# Patient Record
Sex: MALE | Race: BLACK OR AFRICAN AMERICAN | Employment: UNEMPLOYED | URBAN - METROPOLITAN AREA
[De-identification: names, ages, dates, MRNs, and addresses within clinical notes are randomized per-mention and may not be internally consistent; named-entity substitution may affect disease eponyms.]

---

## 2017-10-26 ENCOUNTER — HOSPITAL ENCOUNTER (OUTPATIENT)
Dept: PREADMISSION TESTING | Age: 37
Discharge: HOME | End: 2017-10-26
Payer: MEDICARE

## 2017-10-26 DIAGNOSIS — Z01.818 PRE-OP EXAM: ICD-10-CM

## 2017-10-26 LAB
ANION GAP SERPL CALC-SCNC: 15 MMOL/L (ref 3–18)
ATRIAL RATE: 77 BPM
BUN SERPL-MCNC: 43 MG/DL (ref 7–18)
BUN/CREAT SERPL: 6 (ref 12–20)
CALCIUM SERPL-MCNC: 7.5 MG/DL (ref 8.5–10.1)
CALCULATED P AXIS, ECG09: 65 DEGREES
CALCULATED R AXIS, ECG10: 47 DEGREES
CALCULATED T AXIS, ECG11: 24 DEGREES
CHLORIDE SERPL-SCNC: 98 MMOL/L (ref 100–108)
CO2 SERPL-SCNC: 26 MMOL/L (ref 21–32)
CREAT SERPL-MCNC: 7.04 MG/DL (ref 0.6–1.3)
DIAGNOSIS, 93000: NORMAL
GLUCOSE SERPL-MCNC: 90 MG/DL (ref 74–99)
HCT VFR BLD AUTO: 21.5 % (ref 36–48)
HGB BLD-MCNC: 7.3 G/DL (ref 13–16)
P-R INTERVAL, ECG05: 180 MS
POTASSIUM SERPL-SCNC: 3.4 MMOL/L (ref 3.5–5.5)
Q-T INTERVAL, ECG07: 432 MS
QRS DURATION, ECG06: 98 MS
QTC CALCULATION (BEZET), ECG08: 488 MS
SODIUM SERPL-SCNC: 139 MMOL/L (ref 136–145)
VENTRICULAR RATE, ECG03: 77 BPM

## 2017-10-26 PROCEDURE — 85018 HEMOGLOBIN: CPT | Performed by: ANESTHESIOLOGY

## 2017-10-26 PROCEDURE — 36415 COLL VENOUS BLD VENIPUNCTURE: CPT | Performed by: ANESTHESIOLOGY

## 2017-10-26 PROCEDURE — 93005 ELECTROCARDIOGRAM TRACING: CPT

## 2017-10-26 PROCEDURE — 80048 BASIC METABOLIC PNL TOTAL CA: CPT | Performed by: ANESTHESIOLOGY

## 2017-11-01 NOTE — H&P
PATIENT: Andre Salas  YOB: 1980  DATE: 10/03/2017 2:45 PM   VISIT TYPE: Office Visit  _____________________________________________________________    Completed Orders (this encounter)  Order Interpretation Result Next Lab Date   surgery instructions given education        Assessment/Plan  # Detail Type Description    1. Assessment Secondary hyperparathyroidism of renal origin (N25.81). Impression discussed total PTX with autotransplant, risks and benefits. Patient Plan total parathyroidectomy with autotransplant    Plan Orders The patient had the following test(s) completed today US SOFT TISSUE HEAD AND NECK, Thyroid, Parathyroid, Parotid. This 44year old male presents for hyperparathyroidism. History of Present Illness:  1.  hyperparathyroidism      Onset: gradual.  The problem is severe. The problem has worsened. Presenting / Initial symptoms include bone pain, dry skin and muscle cramping. Risk factors excluded are parathyroidectomy. Intercurrent diseases included are hyperparathyroidism and renal insufficiency/failure. Labs completed to date include alkaline phosphatase, calcium, ionized, CMP and intact PTH. Pertinent negatives include dysphagia, nocturia, paresthesias. Additional information: pt gets HD MWF thru R forearm AVF.         Performed Test Interpretation Result   10/04/2017 US SOFT TISSUE HEAD AND NECK, Thyroid, Parathyroid, Parotid  US of the neck shows no intrathyroidal parathyroid glands, the R sup gland is very large, both inferior glands are identified, the Left superior gland was not visible           PAST MEDICAL/SURGICAL HISTORY  (Detailed)    Disease/disorder Onset Date Management Date Comments     femur 2013      skin graft 2010      loss limbs 2009    Crohns disease       Hypertension       Renal disease         Family History:  (Detailed)  Relationship Family Member Name  Age at Death Condition Onset Age Cause of Death Family history of Diabetes mellitus  N   Father  N  Alive and well 79 N   Mother  N  Alive and well 59 N       Social History:  (Detailed)  Tobacco use reviewed. Preferred language is [place]. MARITAL STATUS/FAMILY/SOCIAL SUPPORT  Currently single. Smoking status: Never smoker. SMOKING STATUS  Use Status Type Smoking Status Usage Per Day Years Used Total Pack Years   no/never  Never smoker          ALCOHOL  There is no history of alcohol use. Buddhist/SPIRITUAL  The patient has None Gnosticism affiliation. Patient Status   Completed with information received for patient transitioning into care. Completed with information received for patient in a summary of care record. Medication Reconciliation  Medications reconciled today. Medication Reviewed  Adherence Medication Name Sig Desc Elsewhere Status   taking as directed LISINOPRIL take 1 tablet by oral route  every day Y Verified   taking as directed AMLODIPINE BESYLATE take 1 tablet by oral route  every day Y Verified   taking as directed ASPIRIN take 1 tablet by oral route  every day Y Verified   taking as directed metoprolol tartrate 25 mg tablet take 1 tablet by oral route 2 times every day N Verified   taking as directed prednisone 20 mg tablet take 1 tablet by oral route 3 times every day N Verified   taking as directed Sensipar 90 mg tablet take 1 tablet by oral route  every day with food N Verified   taking as directed Vicodin take 1 tablet by oral route  every 4 - 6 hours as needed for pain Y Verified     Allergies:  Ingredient Reaction Medication Name Comment   ALPRAZOLAM  Xanax    (Reviewed, no changes.)  Review of Systems  System Neg/Pos Details   Constitutional Negative Fever, night sweats and weight loss. ENMT Negative Hearing loss, tinnitus, vertigo and voice change. Eyes Negative Diplopia and vision loss. Respiratory Negative Asthma, cough, dyspnea, hemoptysis, known TB exposure and wheezing.    Cardio Negative Chest pain, claudication, edema, irregular heartbeat/palpitations and thrombophlebitis. GI Negative Bloating, dysphagia, hemorrhoids, jaundice and reflux.  Negative Dysuria, nocturia, passage stone/gravel and urinary incontinence. Endocrine Negative Cold intolerance and goiter. Neuro Negative Focal weakness, headache, paresthesia, seizures and syncope. Integumentary Negative Change in shape/size of mole(s) and skin lesion. MS Positive Myalgia. MS Negative Back pain, bone/joint symptoms and muscle weakness. Hema/Lymph Negative Easy bleeding and easy bruising. Allergic/Immuno Negative Contact allergy and contact dermatitis. Vital Signs     Height  Time ft in cm Last Measured Height Position   2:38 PM 5.0 9.00 175.26 10/03/2017 0     Weight/BSA/BMI  Time lb oz kg Context BMI kg/m2 BSA m2   2:38 .00  62.596 dressed with shoes 20.38      Blood Pressure  Time BP mm/Hg Position Side Site Method Cuff Size   2:38 /98 sitting right arm automatic adult     Temperature/Pulse/Respiration  Time Temp F Temp C Temp Site Pulse/min Pattern Resp/ min   2:38 PM 98.80 37.11  83 regular      Measured By  Time Measured by   2:38 PM Mayte Wallace       Physical Exam:  Exam Findings Details   Constitutional Normal No acute distress. Well nourished. Well developed. Eyes Normal General - Right: Normal, Left: Normal. Sclera - Right: Normal, Left: Normal.   Neck Exam Normal Inspection - Normal. Palpation - Normal. Parotid gland - Normal. Thyroid gland - Normal. Submandibular lymph nodes - Normal. Cervical lymph nodes - Normal.   Lymph Detail Normal Occipital. Postauricular. Preauricular. Submental. Submandibular. Parotid. Anterior cervical. Posterior cervical. Supraclavicular. Respiratory Normal Cough - Absent. Effort - Normal.   Cardiovascular Normal Inspection - JVD: Absent. Heart rate - Regular rate. Rhythm - Regular. Abdomen Normal Patient is not obese. No abdominal tenderness. No palpable mass. Skin * Inspection - General inspection: warm and dry. Extremity * Fistula - good bruit. Extremity Comments R forearm fistula   Neurological Normal Level of consciousness - Normal. Orientation - Normal. Memory - Normal.   Psychiatric Normal Orientation - Oriented to time, place, person & situation. No agitation. Not anxious. Appropriate mood and affect.          Medications (added, continued, or stopped this visit):  Started Medication Directions Instruction Stopped   05/01/2014 AMLODIPINE BESYLATE take 1 tablet by oral route  every day     05/01/2014 ASPIRIN take 1 tablet by oral route  every day     05/01/2014 LISINOPRIL take 1 tablet by oral route  every day     05/01/2014 metoprolol tartrate 25 mg tablet take 1 tablet by oral route 2 times every day     05/01/2014 prednisone 20 mg tablet take 1 tablet by oral route 3 times every day     05/01/2014 Sensipar 90 mg tablet take 1 tablet by oral route  every day with food     05/01/2014 Vicodin take 1 tablet by oral route  every 4 - 6 hours as needed for pain     Completed by:        Burt Pepper 10/04/2017 1:13 PM   Document generated by:  Marbin Luther 10/04/2017 01:13 PM     -----------------------------------------------------------------------------------------------------------                          Electronically signed by Marbin Luther MD on 10/04/2017 01:27 PM

## 2017-11-02 ENCOUNTER — HOSPITAL ENCOUNTER (INPATIENT)
Age: 37
LOS: 1 days | Discharge: HOME | DRG: 674 | End: 2017-11-03
Attending: SURGERY | Admitting: SURGERY
Payer: MEDICARE

## 2017-11-02 ENCOUNTER — ANESTHESIA (OUTPATIENT)
Dept: SURGERY | Age: 37
DRG: 674 | End: 2017-11-02
Payer: MEDICARE

## 2017-11-02 ENCOUNTER — ANESTHESIA EVENT (OUTPATIENT)
Dept: SURGERY | Age: 37
DRG: 674 | End: 2017-11-02
Payer: MEDICARE

## 2017-11-02 PROBLEM — E89.2 S/P PARATHYROIDECTOMY (HCC): Status: ACTIVE | Noted: 2017-11-02

## 2017-11-02 PROBLEM — Z89.512 S/P BILATERAL BKA (BELOW KNEE AMPUTATION) (HCC): Status: ACTIVE | Noted: 2017-11-02

## 2017-11-02 PROBLEM — Z99.2 ESRD (END STAGE RENAL DISEASE) ON DIALYSIS (HCC): Status: ACTIVE | Noted: 2017-11-02

## 2017-11-02 PROBLEM — N25.81 HYPERPARATHYROIDISM, SECONDARY RENAL (HCC): Status: ACTIVE | Noted: 2017-11-02

## 2017-11-02 PROBLEM — Z89.511 S/P BILATERAL BKA (BELOW KNEE AMPUTATION) (HCC): Status: ACTIVE | Noted: 2017-11-02

## 2017-11-02 PROBLEM — I10 HTN (HYPERTENSION): Status: ACTIVE | Noted: 2017-11-02

## 2017-11-02 PROBLEM — N18.6 ESRD (END STAGE RENAL DISEASE) ON DIALYSIS (HCC): Status: ACTIVE | Noted: 2017-11-02

## 2017-11-02 LAB
ALBUMIN SERPL-MCNC: 2.8 G/DL (ref 3.4–5)
CA-I SERPL-SCNC: 0.8 MMOL/L (ref 1.12–1.32)
CALCIUM SERPL-MCNC: 7.1 MG/DL (ref 8.5–10.1)
INR PPP: 1.2 (ref 0.8–1.2)
POTASSIUM SERPL-SCNC: 3.8 MMOL/L (ref 3.5–5.5)
PROTHROMBIN TIME: 15 SEC (ref 11.5–15.2)

## 2017-11-02 PROCEDURE — 77030018836 HC SOL IRR NACL ICUM -A: Performed by: SURGERY

## 2017-11-02 PROCEDURE — 76210000016 HC OR PH I REC 1 TO 1.5 HR: Performed by: SURGERY

## 2017-11-02 PROCEDURE — 82330 ASSAY OF CALCIUM: CPT | Performed by: SURGERY

## 2017-11-02 PROCEDURE — 74011250636 HC RX REV CODE- 250/636

## 2017-11-02 PROCEDURE — 74011250636 HC RX REV CODE- 250/636: Performed by: SURGERY

## 2017-11-02 PROCEDURE — 74011250636 HC RX REV CODE- 250/636: Performed by: PHYSICIAN ASSISTANT

## 2017-11-02 PROCEDURE — 77030010512 HC APPL CLP LIG J&J -C: Performed by: SURGERY

## 2017-11-02 PROCEDURE — 76010000153 HC OR TIME 1.5 TO 2 HR: Performed by: SURGERY

## 2017-11-02 PROCEDURE — 88331 PATH CONSLTJ SURG 1 BLK 1SPC: CPT | Performed by: SURGERY

## 2017-11-02 PROCEDURE — 82310 ASSAY OF CALCIUM: CPT | Performed by: SURGERY

## 2017-11-02 PROCEDURE — 88332 PATH CONSLTJ SURG EA ADD BLK: CPT | Performed by: SURGERY

## 2017-11-02 PROCEDURE — 74011250637 HC RX REV CODE- 250/637: Performed by: SURGERY

## 2017-11-02 PROCEDURE — 74011000258 HC RX REV CODE- 258: Performed by: SURGERY

## 2017-11-02 PROCEDURE — 85610 PROTHROMBIN TIME: CPT | Performed by: PHYSICIAN ASSISTANT

## 2017-11-02 PROCEDURE — 77030003029 HC SUT VCRL J&J -B: Performed by: SURGERY

## 2017-11-02 PROCEDURE — 0GTM0ZZ RESECTION OF LEFT SUPERIOR PARATHYROID GLAND, OPEN APPROACH: ICD-10-PCS | Performed by: SURGERY

## 2017-11-02 PROCEDURE — 76060000034 HC ANESTHESIA 1.5 TO 2 HR: Performed by: SURGERY

## 2017-11-02 PROCEDURE — 82040 ASSAY OF SERUM ALBUMIN: CPT | Performed by: PHYSICIAN ASSISTANT

## 2017-11-02 PROCEDURE — 86920 COMPATIBILITY TEST SPIN: CPT | Performed by: SURGERY

## 2017-11-02 PROCEDURE — 88305 TISSUE EXAM BY PATHOLOGIST: CPT | Performed by: SURGERY

## 2017-11-02 PROCEDURE — 77030032490 HC SLV COMPR SCD KNE COVD -B: Performed by: SURGERY

## 2017-11-02 PROCEDURE — 77030006643: Performed by: ANESTHESIOLOGY

## 2017-11-02 PROCEDURE — 36415 COLL VENOUS BLD VENIPUNCTURE: CPT | Performed by: SURGERY

## 2017-11-02 PROCEDURE — 77030008683 HC TU ET CUF COVD -A: Performed by: ANESTHESIOLOGY

## 2017-11-02 PROCEDURE — 0GTN0ZZ RESECTION OF RIGHT INFERIOR PARATHYROID GLAND, OPEN APPROACH: ICD-10-PCS | Performed by: SURGERY

## 2017-11-02 PROCEDURE — 84132 ASSAY OF SERUM POTASSIUM: CPT | Performed by: SURGERY

## 2017-11-02 PROCEDURE — 74011000250 HC RX REV CODE- 250

## 2017-11-02 PROCEDURE — 74011000250 HC RX REV CODE- 250: Performed by: SURGERY

## 2017-11-02 PROCEDURE — 77030034115 HC SHR ENDO COAG HARM FOCS J&J -F: Performed by: SURGERY

## 2017-11-02 PROCEDURE — 74011636637 HC RX REV CODE- 636/637: Performed by: SURGERY

## 2017-11-02 PROCEDURE — 0GSP0ZZ REPOSITION LEFT INFERIOR PARATHYROID GLAND, OPEN APPROACH: ICD-10-PCS | Performed by: SURGERY

## 2017-11-02 PROCEDURE — 0GTP0ZZ RESECTION OF LEFT INFERIOR PARATHYROID GLAND, OPEN APPROACH: ICD-10-PCS | Performed by: SURGERY

## 2017-11-02 PROCEDURE — 86900 BLOOD TYPING SEROLOGIC ABO: CPT | Performed by: SURGERY

## 2017-11-02 PROCEDURE — 77030020782 HC GWN BAIR PAWS FLX 3M -B: Performed by: SURGERY

## 2017-11-02 PROCEDURE — 77030018390 HC SPNG HEMSTAT2 J&J -B: Performed by: SURGERY

## 2017-11-02 PROCEDURE — 77030002935 HC SUT MCRYL J&J -C: Performed by: SURGERY

## 2017-11-02 PROCEDURE — 0GTL0ZZ RESECTION OF RIGHT SUPERIOR PARATHYROID GLAND, OPEN APPROACH: ICD-10-PCS | Performed by: SURGERY

## 2017-11-02 PROCEDURE — 77030010375: Performed by: SURGERY

## 2017-11-02 PROCEDURE — 77030008477 HC STYL SATN SLP COVD -A: Performed by: ANESTHESIOLOGY

## 2017-11-02 PROCEDURE — 77030011267 HC ELECTRD BLD COVD -A: Performed by: SURGERY

## 2017-11-02 RX ORDER — DEXTROSE 50 % IN WATER (D50W) INTRAVENOUS SYRINGE
25-50 AS NEEDED
Status: DISCONTINUED | OUTPATIENT
Start: 2017-11-02 | End: 2017-11-02 | Stop reason: HOSPADM

## 2017-11-02 RX ORDER — SODIUM CHLORIDE 0.9 % (FLUSH) 0.9 %
5-10 SYRINGE (ML) INJECTION EVERY 8 HOURS
Status: DISCONTINUED | OUTPATIENT
Start: 2017-11-02 | End: 2017-11-03 | Stop reason: HOSPADM

## 2017-11-02 RX ORDER — OXYCODONE AND ACETAMINOPHEN 5; 325 MG/1; MG/1
1 TABLET ORAL
Status: DISCONTINUED | OUTPATIENT
Start: 2017-11-02 | End: 2017-11-03 | Stop reason: HOSPADM

## 2017-11-02 RX ORDER — ONDANSETRON 2 MG/ML
4 INJECTION INTRAMUSCULAR; INTRAVENOUS ONCE
Status: DISCONTINUED | OUTPATIENT
Start: 2017-11-02 | End: 2017-11-02 | Stop reason: HOSPADM

## 2017-11-02 RX ORDER — CEFAZOLIN SODIUM 2 G/50ML
2 SOLUTION INTRAVENOUS ONCE
Status: COMPLETED | OUTPATIENT
Start: 2017-11-02 | End: 2017-11-02

## 2017-11-02 RX ORDER — SODIUM CHLORIDE 0.9 % (FLUSH) 0.9 %
5-10 SYRINGE (ML) INJECTION AS NEEDED
Status: DISCONTINUED | OUTPATIENT
Start: 2017-11-02 | End: 2017-11-02 | Stop reason: HOSPADM

## 2017-11-02 RX ORDER — LABETALOL HYDROCHLORIDE 5 MG/ML
INJECTION, SOLUTION INTRAVENOUS AS NEEDED
Status: DISCONTINUED | OUTPATIENT
Start: 2017-11-02 | End: 2017-11-02 | Stop reason: HOSPADM

## 2017-11-02 RX ORDER — CALCIUM CARBONATE 200(500)MG
400 TABLET,CHEWABLE ORAL
Status: DISCONTINUED | OUTPATIENT
Start: 2017-11-03 | End: 2017-11-03

## 2017-11-02 RX ORDER — HEPARIN SODIUM 5000 [USP'U]/ML
5000 INJECTION, SOLUTION INTRAVENOUS; SUBCUTANEOUS EVERY 8 HOURS
Status: DISCONTINUED | OUTPATIENT
Start: 2017-11-02 | End: 2017-11-03

## 2017-11-02 RX ORDER — INSULIN LISPRO 100 [IU]/ML
INJECTION, SOLUTION INTRAVENOUS; SUBCUTANEOUS ONCE
Status: DISCONTINUED | OUTPATIENT
Start: 2017-11-02 | End: 2017-11-02 | Stop reason: HOSPADM

## 2017-11-02 RX ORDER — FENTANYL CITRATE 50 UG/ML
INJECTION, SOLUTION INTRAMUSCULAR; INTRAVENOUS AS NEEDED
Status: DISCONTINUED | OUTPATIENT
Start: 2017-11-02 | End: 2017-11-02 | Stop reason: HOSPADM

## 2017-11-02 RX ORDER — ALBUMIN HUMAN 250 G/1000ML
12.5 SOLUTION INTRAVENOUS
Status: DISCONTINUED | OUTPATIENT
Start: 2017-11-02 | End: 2017-11-03 | Stop reason: HOSPADM

## 2017-11-02 RX ORDER — FENTANYL CITRATE 50 UG/ML
25 INJECTION, SOLUTION INTRAMUSCULAR; INTRAVENOUS
Status: DISCONTINUED | OUTPATIENT
Start: 2017-11-02 | End: 2017-11-02 | Stop reason: HOSPADM

## 2017-11-02 RX ORDER — NEOSTIGMINE METHYLSULFATE 5 MG/5 ML
SYRINGE (ML) INTRAVENOUS AS NEEDED
Status: DISCONTINUED | OUTPATIENT
Start: 2017-11-02 | End: 2017-11-02 | Stop reason: HOSPADM

## 2017-11-02 RX ORDER — ROCURONIUM BROMIDE 10 MG/ML
INJECTION, SOLUTION INTRAVENOUS AS NEEDED
Status: DISCONTINUED | OUTPATIENT
Start: 2017-11-02 | End: 2017-11-02 | Stop reason: HOSPADM

## 2017-11-02 RX ORDER — LIDOCAINE HYDROCHLORIDE 20 MG/ML
INJECTION, SOLUTION EPIDURAL; INFILTRATION; INTRACAUDAL; PERINEURAL AS NEEDED
Status: DISCONTINUED | OUTPATIENT
Start: 2017-11-02 | End: 2017-11-02 | Stop reason: HOSPADM

## 2017-11-02 RX ORDER — PROPOFOL 10 MG/ML
INJECTION, EMULSION INTRAVENOUS AS NEEDED
Status: DISCONTINUED | OUTPATIENT
Start: 2017-11-02 | End: 2017-11-02 | Stop reason: HOSPADM

## 2017-11-02 RX ORDER — PREDNISONE 10 MG/1
10 TABLET ORAL DAILY
Status: DISCONTINUED | OUTPATIENT
Start: 2017-11-02 | End: 2017-11-03 | Stop reason: HOSPADM

## 2017-11-02 RX ORDER — ASPIRIN 81 MG/1
81 TABLET ORAL DAILY
Status: DISCONTINUED | OUTPATIENT
Start: 2017-11-02 | End: 2017-11-03 | Stop reason: HOSPADM

## 2017-11-02 RX ORDER — MAGNESIUM SULFATE 100 %
4 CRYSTALS MISCELLANEOUS AS NEEDED
Status: DISCONTINUED | OUTPATIENT
Start: 2017-11-02 | End: 2017-11-02 | Stop reason: HOSPADM

## 2017-11-02 RX ORDER — SODIUM CHLORIDE 9 MG/ML
50 INJECTION, SOLUTION INTRAVENOUS CONTINUOUS
Status: DISCONTINUED | OUTPATIENT
Start: 2017-11-02 | End: 2017-11-02

## 2017-11-02 RX ORDER — HYDROMORPHONE HYDROCHLORIDE 2 MG/ML
0.5 INJECTION, SOLUTION INTRAMUSCULAR; INTRAVENOUS; SUBCUTANEOUS
Status: DISCONTINUED | OUTPATIENT
Start: 2017-11-02 | End: 2017-11-02 | Stop reason: HOSPADM

## 2017-11-02 RX ORDER — AMLODIPINE BESYLATE 5 MG/1
10 TABLET ORAL DAILY
Status: DISCONTINUED | OUTPATIENT
Start: 2017-11-02 | End: 2017-11-03 | Stop reason: HOSPADM

## 2017-11-02 RX ORDER — METOPROLOL SUCCINATE 50 MG/1
100 TABLET, EXTENDED RELEASE ORAL DAILY
Status: DISCONTINUED | OUTPATIENT
Start: 2017-11-02 | End: 2017-11-03 | Stop reason: HOSPADM

## 2017-11-02 RX ORDER — BUPIVACAINE HYDROCHLORIDE 5 MG/ML
INJECTION, SOLUTION EPIDURAL; INTRACAUDAL AS NEEDED
Status: DISCONTINUED | OUTPATIENT
Start: 2017-11-02 | End: 2017-11-02 | Stop reason: HOSPADM

## 2017-11-02 RX ORDER — OXYCODONE AND ACETAMINOPHEN 5; 325 MG/1; MG/1
1 TABLET ORAL AS NEEDED
Status: DISCONTINUED | OUTPATIENT
Start: 2017-11-02 | End: 2017-11-02 | Stop reason: HOSPADM

## 2017-11-02 RX ORDER — SODIUM CHLORIDE 9 MG/ML
100 INJECTION, SOLUTION INTRAVENOUS
Status: DISCONTINUED | OUTPATIENT
Start: 2017-11-02 | End: 2017-11-03 | Stop reason: HOSPADM

## 2017-11-02 RX ORDER — CALCIUM CARBONATE 200(500)MG
400 TABLET,CHEWABLE ORAL
Status: DISCONTINUED | OUTPATIENT
Start: 2017-11-02 | End: 2017-11-02

## 2017-11-02 RX ORDER — GLYCOPYRROLATE 0.2 MG/ML
INJECTION INTRAMUSCULAR; INTRAVENOUS AS NEEDED
Status: DISCONTINUED | OUTPATIENT
Start: 2017-11-02 | End: 2017-11-02 | Stop reason: HOSPADM

## 2017-11-02 RX ORDER — SODIUM CHLORIDE, SODIUM LACTATE, POTASSIUM CHLORIDE, CALCIUM CHLORIDE 600; 310; 30; 20 MG/100ML; MG/100ML; MG/100ML; MG/100ML
50 INJECTION, SOLUTION INTRAVENOUS CONTINUOUS
Status: DISCONTINUED | OUTPATIENT
Start: 2017-11-02 | End: 2017-11-02 | Stop reason: HOSPADM

## 2017-11-02 RX ORDER — LISINOPRIL 20 MG/1
40 TABLET ORAL DAILY
Status: DISCONTINUED | OUTPATIENT
Start: 2017-11-02 | End: 2017-11-03 | Stop reason: HOSPADM

## 2017-11-02 RX ORDER — NALOXONE HYDROCHLORIDE 0.4 MG/ML
0.1 INJECTION, SOLUTION INTRAMUSCULAR; INTRAVENOUS; SUBCUTANEOUS
Status: DISCONTINUED | OUTPATIENT
Start: 2017-11-02 | End: 2017-11-02 | Stop reason: HOSPADM

## 2017-11-02 RX ORDER — MIDAZOLAM HYDROCHLORIDE 1 MG/ML
INJECTION, SOLUTION INTRAMUSCULAR; INTRAVENOUS AS NEEDED
Status: DISCONTINUED | OUTPATIENT
Start: 2017-11-02 | End: 2017-11-02 | Stop reason: HOSPADM

## 2017-11-02 RX ORDER — SODIUM CHLORIDE 0.9 % (FLUSH) 0.9 %
5-10 SYRINGE (ML) INJECTION AS NEEDED
Status: DISCONTINUED | OUTPATIENT
Start: 2017-11-02 | End: 2017-11-03 | Stop reason: HOSPADM

## 2017-11-02 RX ORDER — HYDROCHLOROTHIAZIDE 25 MG/1
25 TABLET ORAL DAILY
Status: DISCONTINUED | OUTPATIENT
Start: 2017-11-02 | End: 2017-11-02

## 2017-11-02 RX ORDER — SEVELAMER CARBONATE 800 MG/1
800 TABLET, FILM COATED ORAL
Status: DISCONTINUED | OUTPATIENT
Start: 2017-11-02 | End: 2017-11-03 | Stop reason: HOSPADM

## 2017-11-02 RX ORDER — ONDANSETRON 2 MG/ML
INJECTION INTRAMUSCULAR; INTRAVENOUS AS NEEDED
Status: DISCONTINUED | OUTPATIENT
Start: 2017-11-02 | End: 2017-11-02 | Stop reason: HOSPADM

## 2017-11-02 RX ORDER — CALCITRIOL 0.25 UG/1
0.5 CAPSULE ORAL DAILY
Status: DISCONTINUED | OUTPATIENT
Start: 2017-11-03 | End: 2017-11-03 | Stop reason: HOSPADM

## 2017-11-02 RX ORDER — HYDROMORPHONE HYDROCHLORIDE 1 MG/ML
1 INJECTION, SOLUTION INTRAMUSCULAR; INTRAVENOUS; SUBCUTANEOUS
Status: DISCONTINUED | OUTPATIENT
Start: 2017-11-02 | End: 2017-11-03 | Stop reason: HOSPADM

## 2017-11-02 RX ADMIN — METOPROLOL SUCCINATE 100 MG: 50 TABLET, EXTENDED RELEASE ORAL at 12:30

## 2017-11-02 RX ADMIN — Medication 10 ML: at 21:48

## 2017-11-02 RX ADMIN — ASPIRIN 81 MG: 81 TABLET, COATED ORAL at 12:30

## 2017-11-02 RX ADMIN — SEVELAMER CARBONATE 800 MG: 800 TABLET, FILM COATED ORAL at 12:29

## 2017-11-02 RX ADMIN — CALCIUM GLUCONATE 2 G: 94 INJECTION, SOLUTION INTRAVENOUS at 18:19

## 2017-11-02 RX ADMIN — CALCIUM GLUCONATE 2 G: 94 INJECTION, SOLUTION INTRAVENOUS at 12:41

## 2017-11-02 RX ADMIN — PROPOFOL 140 MG: 10 INJECTION, EMULSION INTRAVENOUS at 07:21

## 2017-11-02 RX ADMIN — CALCIUM GLUCONATE 2 G: 94 INJECTION, SOLUTION INTRAVENOUS at 21:48

## 2017-11-02 RX ADMIN — LISINOPRIL 40 MG: 20 TABLET ORAL at 12:31

## 2017-11-02 RX ADMIN — SEVELAMER CARBONATE 800 MG: 800 TABLET, FILM COATED ORAL at 18:16

## 2017-11-02 RX ADMIN — CEFAZOLIN SODIUM 2 G: 2 SOLUTION INTRAVENOUS at 07:25

## 2017-11-02 RX ADMIN — Medication 10 ML: at 14:00

## 2017-11-02 RX ADMIN — GLYCOPYRROLATE 0.2 MG: 0.2 INJECTION INTRAMUSCULAR; INTRAVENOUS at 08:54

## 2017-11-02 RX ADMIN — AMLODIPINE BESYLATE 10 MG: 5 TABLET ORAL at 12:31

## 2017-11-02 RX ADMIN — ROCURONIUM BROMIDE 25 MG: 10 INJECTION, SOLUTION INTRAVENOUS at 07:21

## 2017-11-02 RX ADMIN — ANTACID TABLETS 400 MG: 500 TABLET, CHEWABLE ORAL at 18:16

## 2017-11-02 RX ADMIN — LIDOCAINE HYDROCHLORIDE 60 MG: 20 INJECTION, SOLUTION EPIDURAL; INFILTRATION; INTRACAUDAL; PERINEURAL at 07:21

## 2017-11-02 RX ADMIN — MIDAZOLAM HYDROCHLORIDE 2 MG: 1 INJECTION, SOLUTION INTRAMUSCULAR; INTRAVENOUS at 07:16

## 2017-11-02 RX ADMIN — FENTANYL CITRATE 100 MCG: 50 INJECTION, SOLUTION INTRAMUSCULAR; INTRAVENOUS at 07:27

## 2017-11-02 RX ADMIN — HEPARIN SODIUM 5000 UNITS: 5000 INJECTION, SOLUTION INTRAVENOUS; SUBCUTANEOUS at 21:48

## 2017-11-02 RX ADMIN — PREDNISONE 10 MG: 10 TABLET ORAL at 12:31

## 2017-11-02 RX ADMIN — LABETALOL HYDROCHLORIDE 10 MG: 5 INJECTION, SOLUTION INTRAVENOUS at 07:30

## 2017-11-02 RX ADMIN — SODIUM CHLORIDE: 900 INJECTION, SOLUTION INTRAVENOUS at 07:16

## 2017-11-02 RX ADMIN — Medication 1 MG: at 08:54

## 2017-11-02 RX ADMIN — FENTANYL CITRATE 100 MCG: 50 INJECTION, SOLUTION INTRAMUSCULAR; INTRAVENOUS at 07:20

## 2017-11-02 RX ADMIN — FENTANYL CITRATE 50 MCG: 50 INJECTION, SOLUTION INTRAMUSCULAR; INTRAVENOUS at 08:01

## 2017-11-02 RX ADMIN — ANTACID TABLETS 400 MG: 500 TABLET, CHEWABLE ORAL at 12:30

## 2017-11-02 RX ADMIN — ONDANSETRON 4 MG: 2 INJECTION INTRAMUSCULAR; INTRAVENOUS at 08:25

## 2017-11-02 NOTE — IP AVS SNAPSHOT
[address] [place] [address] 
[phone] Patient: Agusto Gu MRN: [id number] EXS:9/21/1727 About your hospitalization You were admitted on:  November 2, 2017 You last received care in the:  [address] [place] [address] You were discharged on:  November 3, 2017 Why you were hospitalized Your primary diagnosis was:  S/P Parathyroidectomy (Hcc) Your diagnoses also included:  Hyperparathyroidism, Secondary Renal (Hcc), S/P Bilateral Bka (Below Knee Amputation) (Hcc), Esrd (End Stage Renal Disease) On Dialysis (Hcc), Htn (Hypertension), Hypocalcemia, Anemia, Hyperparathyroidism (Hcc) Things You Need To Do (next 8 weeks) Follow up with Fransico Dominguez MD  
  
Phone:  [phone] Where:  [facility] [address] Monday Nov 13, 2017 Follow up with Royal Jayne MD  
Follow up appointment scheduled for November 13, 2017 at 1:00 p.m. Phone:  [phone] Where:  [facility], [address] [place] [address], [facility] [address] Discharge Orders None A check luis felipe indicates which time of day the medication should be taken. My Medications STOP taking these medications SENSIPAR 90 mg Tab Generic drug:  cinacalcet TAKE these medications as instructed Instructions Each Dose to Equal  
 Morning Noon Evening Bedtime  
 amLODIPine 10 mg tablet Commonly known as:  Voncile Du Bois Your last dose was: Your next dose is: Take 10 mg by mouth daily. 10 mg  
    
   
   
   
  
 aspirin delayed-release 81 mg tablet Your last dose was: Your next dose is: Take 81 mg by mouth daily. 81 mg  
    
   
   
   
  
 hydroCHLOROthiazide 25 mg tablet Commonly known as:  HYDRODIURIL Your last dose was: Your next dose is: Take 25 mg by mouth daily.   
 25 mg  
    
   
   
   
 HYDROcodone-acetaminophen 5-325 mg per tablet Commonly known as:  Mckayla Lechuga Your last dose was: Your next dose is: Take 1 Tab by mouth every four (4) hours as needed for Pain. 1 Tab  
    
   
   
   
  
 lisinopril 40 mg tablet Commonly known as:  Jayda Kamara Your last dose was: Your next dose is: Take 40 mg by mouth daily. 40 mg  
    
   
   
   
  
 metoprolol succinate 100 mg tablet Commonly known as:  TOPROL-XL Your last dose was: Your next dose is: Take 100 mg by mouth daily. 100 mg  
    
   
   
   
  
 predniSONE 10 mg tablet Commonly known as:  [place] F[place] Your last dose was: Your next dose is: Take 10 mg by mouth daily. 10 mg  
    
   
   
   
  
 RENVELA 800 mg Tab tab Generic drug:  sevelamer carbonate Your last dose was: Your next dose is: Take 800 mg by mouth three (3) times daily (with meals). 800 mg Discharge Instructions DISCHARGE SUMMARY from Nurse PATIENT INSTRUCTIONS: 
 
After general anesthesia or intravenous sedation, for 24 hours or while taking prescription Narcotics: · Limit your activities · Do not drive and operate hazardous machinery · Do not make important personal or business decisions · Do  not drink alcoholic beverages · If you have not urinated within 8 hours after discharge, please contact your surgeon on call. Report the following to your surgeon: 
· Excessive pain, swelling, redness or odor of or around the surgical area · Temperature over 100.5 · Nausea and vomiting lasting longer than 4 hours or if unable to take medications · Any signs of decreased circulation or nerve impairment to extremity: change in color, persistent  numbness, tingling, coldness or increase pain · Any questions What to do at Home: Recommended activity: Activity as tolerated and No driving while on analgesics If you experience any of the following symptoms temperature greater than 101, uncontrolled nausea, vomiting, diarrhea, large amounts bleeding from surgical site, yellow/green drainage from surgical site, swelling, difficulty swallowing, or difficulty breathing, please follow up with Dr. Luis Gotti. *  Please give a list of your current medications to your Primary Care Provider. *  Please update this list whenever your medications are discontinued, doses are 
    changed, or new medications (including over-the-counter products) are added. *  Please carry medication information at all times in case of emergency situations. These are general instructions for a healthy lifestyle: No smoking/ No tobacco products/ Avoid exposure to second hand smoke Surgeon General's Warning:  Quitting smoking now greatly reduces serious risk to your health. Obesity, smoking, and sedentary lifestyle greatly increases your risk for illness A healthy diet, regular physical exercise & weight monitoring are important for maintaining a healthy lifestyle You may be retaining fluid if you have a history of heart failure or if you experience any of the following symptoms:  Weight gain of 3 pounds or more overnight or 5 pounds in a week, increased swelling in our hands or feet or shortness of breath while lying flat in bed. Please call your doctor as soon as you notice any of these symptoms; do not wait until your next office visit. Recognize signs and symptoms of STROKE: 
 
F-face looks uneven A-arms unable to move or move unevenly S-speech slurred or non-existent T-time-call 911 as soon as signs and symptoms begin-DO NOT go Back to bed or wait to see if you get better-TIME IS BRAIN. Warning Signs of HEART ATTACK Call 911 if you have these symptoms: ? Chest discomfort. Most heart attacks involve discomfort in the center of the chest that lasts more than a few minutes, or that goes away and comes back. It can feel like uncomfortable pressure, squeezing, fullness, or pain. ? Discomfort in other areas of the upper body. Symptoms can include pain or discomfort in one or both arms, the back, neck, jaw, or stomach. ? Shortness of breath with or without chest discomfort. ? Other signs may include breaking out in a cold sweat, nausea, or lightheadedness. Don't wait more than five minutes to call [address]! Fast action can save your life. Calling 911 is almost always the fastest way to get lifesaving treatment. Emergency Medical Services staff can begin treatment when they arrive  up to an hour sooner than if someone gets to the hospital by car. The discharge information has been reviewed with the patient. The patient verbalized understanding. Discharge medications reviewed with the patient and appropriate educational materials and side effects teaching were provided. ___________________________________________________________________________________________________________________________________ Parathyroidectomy: What to Expect at [facility] Your Recovery Parathyroidectomy is the removal of one or more of the four parathyroid glands in the neck. These small glands, located on the thyroid gland, help control the amount of calcium in the body. When they are too active, these glands cause high levels of calcium. This is called hyperparathyroidism (say \"hy-per-pair-gg-CGO-stub-iz-um\"). You may leave the hospital with stitches in the cut the doctor made (incision). Your doctor will tell you if you need to come back to have these removed. You may still have a tube called a drain in your neck. Your doctor will take this out a few days after your surgery. You may have some trouble chewing and swallowing after you go home.  Your voice probably will be hoarse, and you may have trouble talking. For most people, these problems get better within a few weeks, but it can take longer. In some cases, this surgery causes permanent problems with chewing, speaking, or swallowing. This care sheet gives you a general idea about how long it will take for you to recover. But each person recovers at a different pace. Follow the steps below to get better as quickly as possible. How can you care for yourself at home? Activity ? · Rest when you feel tired. Getting enough sleep will help you recover. When you lie down, put two or three pillows under your head to keep it raised. ? · Try to walk each day. Start by walking a little more than you did the day before. Bit by bit, increase the amount you walk. Walking boosts blood flow and helps prevent pneumonia and constipation. ? · Avoid strenuous physical activity and lifting heavy objects for 3 weeks after surgery or until your doctor says it is okay. ? · Do not over-extend your neck backwards for 2 weeks after surgery. ? · Ask your doctor when you can drive again. ? · You may take a shower, unless you still have a drain. Pat the incision dry. If you have a drain coming out of your incision, follow your doctor's instructions to care for it. Diet ? · If swallowing is painful, start out with cold drinks, flavored ice pops, and ice cream. Next, try soft foods like pudding, yogurt, canned or cooked fruit, scrambled eggs, and mashed potatoes. Avoid eating hard or scratchy foods like chips or raw vegetables. Avoid orange or tomato juice and other acidic foods that can sting the throat. ? · If you cough right after drinking, try drinking thicker liquids, such as a smoothie. ? · You may notice that your bowel movements are not regular right after your surgery. This is common. Try to avoid constipation and straining with bowel movements. You may want to take a fiber supplement every day.  If you have not had a bowel movement after a couple of days, ask your doctor about taking a mild laxative. Medicines ? · Your doctor will tell you if and when you can restart your medicines. He or she will also give you instructions about taking any new medicines. ? · If you take blood thinners, such as warfarin (Coumadin), clopidogrel (Plavix), or aspirin, be sure to talk to your doctor. He or she will tell you if and when to start taking those medicines again. Make sure that you understand exactly what your doctor wants you to do. ? · Be safe with medicines. Take pain medicines exactly as directed. ¨ If the doctor gave you a prescription medicine for pain, take it as prescribed. ¨ If you are not taking a prescription pain medicine, ask your doctor if you can take an over-the-counter medicine. ? · If you think your pain medicine is making you sick to your stomach: 
¨ Take your medicine after meals (unless your doctor has told you not to). ¨ Ask your doctor for a different pain medicine. ? · Your doctor may prescribe calcium to prevent problems after surgery from low calcium. Not having enough calcium can cause symptoms such as tingling around your mouth or in your hands and feet. ? · Your doctor may have prescribed antibiotics. Take them as directed. Do not stop taking them just because you feel better. You need to take the full course of antibiotics. Incision care ? · If your doctor told you how to care for your incision, follow your doctor's instructions. If you did not get instructions, follow this general advice: ¨ After the first 24 to 48 hours, wash around the incision with clean water 2 times a day. Don't use hydrogen peroxide or alcohol, which can slow healing. ? · You may have a drain near your incision. Your doctor will tell you how to take care of it. Follow-up care is a key part of your treatment and safety.  Be sure to make and go to all appointments, and call your doctor if you are having problems. It's also a good idea to know your test results and keep a list of the medicines you take. When should you call for help? Call 911 anytime you think you may need emergency care. For example, call if: 
? · You passed out (lost consciousness). ? · You have sudden chest pain and shortness of breath, or you cough up blood. ? · You have severe trouble breathing. ?Call your doctor now or seek immediate medical care if: 
? · You have loose stitches, or your incision comes open. ? · You have blood leaking from your incision. ? · You have signs of infection, such as: 
¨ Increased pain, swelling, warmth, or redness. ¨ Red streaks leading from the incision. ¨ Pus draining from the incision. ¨ A fever. ? · You have a tingling feeling around your mouth. ? · You have cramping or tingling in your hands and feet. ? Watch closely for changes in your health, and be sure to contact your doctor if: 
? · You have trouble talking. ? · You are sick to your stomach or cannot keep fluids down. ? · You do not have a bowel movement after taking a laxative. Where can you learn more? Go to http://shanice-flakito.info/. Enter M010 in the search box to learn more about \"Parathyroidectomy: What to Expect at Home. \" Current as of: May 12, 2017 Content Version: 11.4 © 1186-4193 [employer]. Care instructions adapted under license by [employer] (which disclaims liability or warranty for this information). If you have questions about a medical condition or this instruction, always ask your healthcare professional. [place] [address] any warranty or liability for your use of this information. Introducing [facility]!    
 Alice Jack introduces [employer]ent patient portal. Now you can access parts of your medical record, email your doctor's office, and request medication refills online. 1. In your internet browser, go to [email or web address]. [employer]/Who Can Fix My Cart 2. Click on the First Time User? Click Here link in the Sign In box. You will see the New Member Sign Up page. 3. Enter your Hively Access Code exactly as it appears below. You will not need to use this code after youve completed the sign-up process. If you do not sign up before the expiration date, you must request a new code. · Hively Access Code: [email or web address] Expires: 1/8/2018  9:39 AM 
 
4. Enter the last four digits of your Social Security Number (xxxx) and Date of Birth (mm/dd/yyyy) as indicated and click Submit. You will be taken to the next sign-up page. 5. Create a Merge Socialt ID. This will be your Hively login ID and cannot be changed, so think of one that is secure and easy to remember. 6. Create a Hively password. You can change your password at any time. 7. Enter your Password Reset Question and Answer. This can be used at a later time if you forget your password. 8. Enter your e-mail address. You will receive e-mail notification when new information is available in [address] 9. Click Sign Up. You can now view and download portions of your medical record. 10. Click the Download Summary menu link to download a portable copy of your medical information. If you have questions, please visit the Frequently Asked Questions section of the Hively website. Remember, Hively is NOT to be used for urgent needs. For medical emergencies, dial 911. Now available from your iPhone and Android! Providers Seen During Your Hospitalization Provider Specialty Primary office phone Vinay Mares MD General Surgery [phone] Your Primary Care Physician (PCP) Primary Care Physician Office Phone Office Fax Amy Le [place] [phone] You are allergic to the following Allergen Reactions Xanax (Alprazolam) Other (comments) Hallucinations Recent Documentation Height Weight BMI Smoking Status 1.74 m 56.9 kg 18.79 kg/m2 Former Smoker Emergency Contacts Name Discharge Info Relation Home Work Mobile Ofelia Ruiz DISCHARGE CAREGIVER [3] Mother [14] [phone] Patient Belongings The following personal items are in your possession at time of discharge: 
  Dental Appliances: None  Visual Aid: None          Jewelry: None  Clothing: Jacket/Coat (with mother Omar Graham)    Other Valuables: Prosthesis (Bilat legs prosthetiics with ofelia taken to car) Please provide this summary of care documentation to your next provider. Signatures-by signing, you are acknowledging that this After Visit Summary has been reviewed with you and you have received a copy. Patient Signature:  ____________________________________________________________ Date:  ____________________________________________________________  
  
Aloma Snellen Provider Signature:  ____________________________________________________________ Date:  ____________________________________________________________

## 2017-11-02 NOTE — PROGRESS NOTES
Physical Therapy Screening:  Services are not indicated at this time. An InBasket screening referral was triggered for physical therapy based on results obtained during the nursing admission assessment. The patients chart was reviewed and the patient is not appropriate for a skilled therapy evaluation at this time. Please consult physical therapy if any therapy needs arise. Thank you.     Chantell Marin, PT

## 2017-11-02 NOTE — ANESTHESIA POSTPROCEDURE EVALUATION
Post-Anesthesia Evaluation and Assessment    Cardiovascular Function/Vital Signs  Visit Vitals    /81    Pulse 68    Temp 36.7 °C (98 °F)    Resp 23    Ht 5' 8.5\" (1.74 m)    Wt 61.5 kg (135 lb 9 oz)    SpO2 100%    BMI 20.31 kg/m2       Patient is status post Procedure(s):  TOTAL PARATHYROIDECTOMY W/AUTO TRANSPLANT **SPEC POP**. Nausea/Vomiting: Controlled. Postoperative hydration reviewed and adequate. Pain:  Pain Scale 1: Numeric (0 - 10) (11/02/17 0930)  Pain Intensity 1: 0 (11/02/17 0930)   Managed. Neurological Status:   Neuro (WDL): Within Defined Limits (11/02/17 0934)   At baseline. Mental Status and Level of Consciousness: Baseline and stable. Pulmonary Status:   O2 Device: Nasal cannula (11/02/17 0915)   Adequate oxygenation and airway patent. Complications related to anesthesia: None    Post-anesthesia assessment completed. No concerns. Patient has met all discharge requirements.     Signed By: Chapincito Galeas MD

## 2017-11-02 NOTE — PERIOP NOTES
TRANSFER - OUT REPORT:    Verbal report given to Kecia Flanagan RN(name) on Josse Brunner  being transferred to (unit) for routine progression of care       Report consisted of patients Situation, Background, Assessment and   Recommendations(SBAR). Information from the following report(s) SBAR, Kardex, OR Summary, Procedure Summary, Intake/Output, MAR and Recent Results was reviewed with the receiving nurse. Lines:   Peripheral IV 11/02/17 Left Arm (Active)   Site Assessment Clean, dry, & intact 11/2/2017  9:49 AM   Phlebitis Assessment 0 11/2/2017  9:49 AM   Infiltration Assessment 0 11/2/2017  9:49 AM   Dressing Status Clean, dry, & intact 11/2/2017  9:49 AM   Dressing Type Transparent 11/2/2017  9:49 AM   Hub Color/Line Status Pink; Infusing 11/2/2017  9:49 AM        Opportunity for questions and clarification was provided.       Patient transported with:   O2 @ 2 liters  Registered Nurse  [facility]

## 2017-11-02 NOTE — PROGRESS NOTES
Nephrology Progress note    Subjective: Luis Watson is a 44 y.o.   male with h/o HTN, HIV disease, Crohn;s Disease, Bilateral BKA , ESRD with Severe Secondary hyperparathyroidism who underwent Total Parathyroidectomy today with Autotransplant. He is referred for ESRD management. S Ca 7.5 => 7.1 post op. Previous S ca 9.5 on 10/30/17. PTH 2851 on 10/9/17. He is presently asymptomatic. Denies paraesthesia, numbness or tingling in extremities.     Admit Date: 11/2/2017  Active Problems:    Hyperparathyroidism, secondary renal ([facility] (11/2/2017)      S/P bilateral BKA (below knee amputation) ([facility] (11/2/2017)      ESRD (end stage renal disease) on dialysis ([facility] (11/2/2017)      HTN (hypertension) (11/2/2017)      S/P parathyroidectomy ([facility] (11/2/2017)      Current Facility-Administered Medications   Medication Dose Route Frequency    amLODIPine (NORVASC) tablet 10 mg  10 mg Oral DAILY    aspirin delayed-release tablet 81 mg  81 mg Oral DAILY    lisinopril (PRINIVIL, ZESTRIL) tablet 40 mg  40 mg Oral DAILY    metoprolol succinate (TOPROL-XL) XL tablet 100 mg  100 mg Oral DAILY    predniSONE (DELTASONE) tablet 10 mg  10 mg Oral DAILY    sevelamer carbonate (RENVELA) tab 800 mg  800 mg Oral TID WITH MEALS    sodium chloride (NS) flush 5-10 mL  5-10 mL IntraVENous Q8H    sodium chloride (NS) flush 5-10 mL  5-10 mL IntraVENous PRN    calcium carbonate (TUMS) chewable tablet 400 mg [elemental]  400 mg Oral TID WITH MEALS    calcium gluconate 2 g in 0.9% sodium chloride 100 mL IVPB  2 g IntraVENous TID    oxyCODONE-acetaminophen (PERCOCET) 5-325 mg per tablet 1 Tab  1 Tab Oral Q4H PRN    HYDROmorphone (PF) (DILAUDID) injection 1 mg  1 mg IntraVENous Q3H PRN    heparin (porcine) injection 5,000 Units  5,000 Units SubCUTAneous Q8H         Allergy:   Allergies   Allergen Reactions    Xanax [Alprazolam] Other (comments)     Hallucinations        Objective:     Visit Vitals    /88 (BP 1 Location: Left arm, BP Patient Position: At rest)    Pulse 64    Temp 98.2 °F (36.8 °C)    Resp 14    Ht 5' 8.5\" (1.74 m)    Wt 61.5 kg (135 lb 9 oz)    SpO2 100%    BMI 20.31 kg/m2         Intake/Output Summary (Last 24 hours) at 11/02/17 1811  Last data filed at 11/02/17 0851   Gross per 24 hour   Intake              600 ml   Output                0 ml   Net              600 ml       Physical Exam:       General: No acute distress   HENT: Atraumatic and normocephalic   Eyes: Normal conjunctiva   Neck: Supple    Cardiovascular: Normal S1 & S2, no m/r/g   Pulmonary/Chest Wall: Clear to auscultation bilaterally   Abdominal: Soft and non-tender   Musculoskeletal: S/p Bilat BKA. S/p amputation of fingers. Neurological: No focal deficits     Data Review:  Lab Results   Component Value Date/Time    Sodium 139 10/26/2017 01:57 PM    Potassium 3.8 11/02/2017 06:08 AM    Chloride 98 10/26/2017 01:57 PM    CO2 26 10/26/2017 01:57 PM    Anion gap 15 10/26/2017 01:57 PM    Glucose 90 10/26/2017 01:57 PM    BUN 43 10/26/2017 01:57 PM    Creatinine 7.04 10/26/2017 01:57 PM    BUN/Creatinine ratio 6 10/26/2017 01:57 PM    GFR est AA 11 10/26/2017 01:57 PM    GFR est non-AA 9 10/26/2017 01:57 PM    Calcium 7.1 11/02/2017 03:33 PM     Lab Results   Component Value Date/Time    HGB 7.3 10/26/2017 01:57 PM    HCT 21.5 10/26/2017 01:57 PM     Lab Results   Component Value Date/Time    Calcium 7.1 11/02/2017 03:33 PM     No results found for: IRON, FE, TIBC, IBCT, PSAT, FERR  No results found for: FERR      Impression:     1. ESRD  2. Severe SHPT s/p Parathyroidectomy with Autotransplant. 3. HTN  4. HIV disease  5. S/p Bilat BKA  6. Anemia of CKD  7. Crohn's disease      Plan:     1. Monitor S Ca and Ionized Ca  2. Continue oral Calcium acetate po between meals and IV Calcium gluconate. 3. HD tomorrow with 3.5 Ca bath  4. Continue BP meds  5. Procrit on HD  6.  Calcitriol po    Leeanna Chao MD, MPH [facility]  [phone]

## 2017-11-02 NOTE — ANESTHESIA PREPROCEDURE EVALUATION
Anesthetic History   No history of anesthetic complications            Review of Systems / Medical History  Patient summary reviewed, nursing notes reviewed and pertinent labs reviewed    Pulmonary  Within defined limits                 Neuro/Psych         Psychiatric history     Cardiovascular    Hypertension                   GI/Hepatic/Renal         Renal disease: ESRD and dialysis       Endo/Other             Other Findings              Physical Exam    Airway  Mallampati: II  TM Distance: 4 - 6 cm  Neck ROM: normal range of motion   Mouth opening: Normal     Cardiovascular  Regular rate and rhythm,  S1 and S2 normal,  no murmur, click, rub, or gallop             Dental  No notable dental hx       Pulmonary  Breath sounds clear to auscultation               Abdominal  Abdominal exam normal       Other Findings            Anesthetic Plan    ASA: 3  Anesthesia type: general          Induction: Intravenous  Anesthetic plan and risks discussed with: Family and Patient

## 2017-11-02 NOTE — PROGRESS NOTES
Discussed w/ STEFANIA Drummond and Nurse Specialist Genaro Fulling this post op parathyroidectomy patient that just arrived to the floor. HD on MWF  BL BKA and amputation of 5 fingers on left hand. Hx of clots  It appears Dr. Manuel Gomes ordered IV Calcium gluconate 2 grams TID ongoing, She also entered a consult for nephrology. Entered stat orders for ionized calcium and albumin. Pharmacist Naya Bates entered one time CMP for tomorrow morning and is monitoring the calcium gluconate order.     [address] [place] [address] Pharmacist  [phone]

## 2017-11-02 NOTE — PERIOP NOTES
TRANSFER - IN REPORT:    Verbal report received from ORN and CRNA(name) on [place]  being received from OR(unit) for routine progression of care      Report consisted of patients Situation, Background, Assessment and   Recommendations(SBAR). Information from the following report(s) SBAR, Kardex, OR Summary, Procedure Summary, Intake/Output, MAR and Recent Results was reviewed with the receiving nurse. Opportunity for questions and clarification was provided. Assessment completed upon patients arrival to unit and care assumed.

## 2017-11-02 NOTE — INTERVAL H&P NOTE
H&P Update:  Sujatha Bautista was seen and examined. History and physical has been reviewed. The patient has been examined. There have been no significant clinical changes since the completion of the originally dated History and Physical.  Patient identified by surgeon; surgical site was confirmed by patient and surgeon.     Signed By: Mindy Montague MD     November 2, 2017 7:17 AM

## 2017-11-02 NOTE — CONSULTS
Medicine Consult    Patient:  Omega Martinez 44 y.o. male  Asked to evaluate patient by Dr Anna Marie Villatoro  Primary Care Provider:  Tamiko Scott MD  Date of Admission:  11/2/2017  Reason for Consult: Medical Management after Parathyroidectomy        Assessment/Plan     Patient Active Problem List   Diagnosis Code    Hyperparathyroidism, secondary renal ([facility] N25.81       PLAN:    1. S/P Total Parathyroidectomy w/ Autograph  2. ESRD on HD  3. HTN  4. Chron's Disease   5. Hx of Thromboembolus  6. Hx of Depression  7. Bilateral BKA    1. Surgery performed by Dr Anna Marie Villatoro this AM. Patient awake and alert at time of exam, no acute distress. Dressing on neck is clean, dry, and intact. Nephrology paged for continued care for this patient to assist with management of calcium levels. Await lab results at this juncture. 2. Nephrology paged. Will see patient today. Patient on MWF HD schedule with Dr Francine Person. Tasha Ascencio RN spoke with Dr Roosevelt Lyle about this patient. 3. Continue Norvasc, HCTZ, Prinivil, Toprol at this time, BP stable. 4. Prednisone 10mg daily. 5. Will initiate heparin for prophylaxis    Thank you for allowing us to participate in this patients care. HPI:   CC: Hyperparathyroidism    Omega Martinez is a 44 y.o. male with past medical history significant for ESRD on HD, hyperparathyroidism, HTN, who presented for total parathyroidectomy with autograph. Previous presenting symptoms include bone pain, dry skin, and muscle cramping. Patient is seen by Dr Francine Person, nephrologist, who gets HD through his [facility]. His HTN is medically controlled. His surgical history is significant for bilateral BKA, amputation of all fingers on his left hand, along with various orthopedic procedures.     Past Medical History:   Diagnosis Date    Chronic kidney disease 2009    ESRD dialysis     Chronic pain     bilateral Phantom pains legs     Crohn's disease ([facility]     Hypertension     Parathyroid disorder ([facility]     Psychiatric disorder     H/O depression    Thromboembolus ([facility] 2009    left arm     Past Surgical History:   Procedure Laterality Date    HX AMPUTATION Bilateral 2009    BKA    HX AMPUTATION Left 2009    5 fingers    HX COLECTOMY      HX ORTHOPAEDIC Right     ORIF femur    HX ORTHOPAEDIC Right     Hip pinning    HX ORTHOPAEDIC Left     ORIF elbow    HX OTHER SURGICAL Left     Skin graft arm    VASCULAR SURGERY PROCEDURE UNLIST Right     AV fistula      Social History   Substance Use Topics    Smoking status: Former Smoker     Quit date: 1/1/2004    Smokeless tobacco: Never Used    Alcohol use None     History reviewed. No pertinent family history. No current facility-administered medications on file prior to encounter. No current outpatient prescriptions on file prior to encounter. Allergies   Allergen Reactions    Xanax [Alprazolam] Other (comments)     Hallucinations           Review of Systems  Constitutional: No fever, chills, diaphoresis, malaise, fatigue or weight gain/loss or falls  Skin: no itching or rashes  HEENT: no ear discomfort, hearing loss, tinnitus, epistaxis or sore throat  EYES: no blurry vision, double vision or photophobia  CARDIOVASCULAR: no claudication, cp, palpitations, orthopnea, pnd or LE edema  PULMONARY: no cough, wheeze, shortness of breath or sputum production  GI: no nausea, vomiting, diarrhea, abdominal pain, melena, hematemesis or brbpr  : no dysuria, hematuria  MUSCULOSKELETAL: no back pain, joint pain or myalgias  ENDOCRINE: no heat/cold intolerance, polyuria or polydipsia  HEME: no easy bruising or bleeding  NEURO: no unilateral weakness, numbness, tingling or seizures      Physical Exam:      Visit Vitals    /78 (BP 1 Location: Left arm, BP Patient Position: At rest)    Pulse 64    Temp 98 °F (36.7 °C)    Resp 14    Ht 5' 8.5\" (1.74 m)    Wt 61.5 kg (135 lb 9 oz)    SpO2 100%    BMI 20.31 kg/m2     Body mass index is 20.31 kg/(m^2).     Physical Exam:  GEN: well nourished, laying in bed in no acute distress  HEENT: atraumatic, nose normal,oropharynx clear, MMM  NECK: supple, trachea midline, no supraclavicular or submandibular adenopathy noted, surgical dressing in place, clean, dry and intact. EYES: conjuctiva normal, lids with out lesions, PERRL  HEART: RRR systolic murmur, pmi nondisplaced, pulses 2+ distally  LUNGS: equal chest wall expansion, cta bl with out wheezes/rales or rhonchi  AB: soft, +BS, nt/nd no organomegaly  NEURO: alert, awake and oriented x3, gait not assessed, cranial nerves intact.   SKIN: dry, intact, warm no breakdown noted  EXTREMITIES: Bilateral BKA, amputation of fingers on left hand        Laboratory Studies:    Recent Results (from the past 24 hour(s))   TYPE & SCREEN    Collection Time: 11/02/17  6:08 AM   Result Value Ref Range    Crossmatch Expiration 11/05/2017     ABO/Rh(D) Theola Henriette POSITIVE     Antibody screen NEG    POTASSIUM    Collection Time: 11/02/17  6:08 AM   Result Value Ref Range    Potassium 3.8 3.5 - 5.5 mmol/L

## 2017-11-02 NOTE — PERIOP NOTES
Transferred patient to room patient alert and oriented with no acute distress noted. Vital signs within normal limits. Dressing clean dry and intact.   Trach tray brought to room with patient No further questions from receiving nurse

## 2017-11-02 NOTE — OP NOTES
[address] [place] [address]  OPERATIVE REPORT    Name:  Silvia Manrique  MR#:  [id number]  :  1980  Account #:  [de-identified]  Date of Adm:  2017  Date of Surgery:  2017      PREOPERATIVE DIAGNOSIS: Severe secondary  hyperparathyroidism. POSTOPERATIVE DIAGNOSIS: Severe secondary  hyperparathyroidism. PROCEDURE PERFORMED: Total parathyroidectomy with  autotransplant. ATTENDING SURGEON: Taylor Kim MD    ANESTHESIA: General and local.    ESTIMATED BLOOD LOSS: 5 mL. SPECIMENS REMOVED: Right superior and inferior parathyroid  glands, and left superior and inferior parathyroid glands. INDICATIONS FOR PROCEDURE: This is a 26-year-old male with  end-stage renal disease, who has significant secondary  hyperparathyroidism. He is brought to the operating room for total  parathyroidectomy with autotransplant. DESCRIPTION OF PROCEDURE: The patient was brought in the  operating room, placed on the table in the supine position. After  placing monitors and adequate general anesthesia, both arms were  tucked and a roll was placed under his upper back. His head was  placed in a head ring. IV antibiotics were given. The anterior neck was  prepped and draped in the usual sterile fashion. A curved incision was made several fingerbreadths above the sternal  notch through the skin down to subcutaneous tissue. Platysma was  divided with the electrocautery. One anterior cervical vein was  dissected out, clamped and ligated with a 4-0 silk suture. Platysmal  flaps were created with electrocautery. The strap muscles were divided  in the midline and then divided off the thyroid gland bilaterally. The  right superior parathyroid gland was removed first. It was significantly  enlarged, measuring up to 3 cm. It was dissected out. Its vascular  pedicle was divided with the Harmonic scalpel. A portion was sent for  frozen section and it was hypercellular parathyroid gland.  Surgicel was  placed in this location. The right inferior parathyroid gland was  dissected out and it was enlarged as well. Its vascular pedicle was  divided with the Harmonic scalpel and a portion of it was sent for  frozen section and it was hypercellular parathyroid tissue. Surgicel was  placed in the right inferior neck. The left neck was then explored. The  left superior parathyroid gland was dissected out. It was enlarged, but  not as significantly as the right-sided glands. It was dissected out and  its vascular pedicle was divided with the Harmonic scalpel. A portion  was sent for frozen section and it was hypercellular parathyroid tissue. Surgicel was placed at this location. The left inferior gland was the last  to be dissected out. It was the most normal looking one; however, it  was slightly firm. It measured only about 8 mm. It was dissected out  and its vascular pedicle was divided with the Harmonic scalpel. A  portion was sent for frozen section and it was hypercellular parathyroid  tissue. Surgicel was placed in the left inferior neck. Strap muscles were  reapproximated with interrupted 3-0 Vicryl suture. The left inferior  parathyroid gland was divided into 5 small pieces and autotransplanted  in the left strap muscle. Each little pocket was secured with a small  clip. The remaining portion of the left inferior parathyroid gland was  sent for permanent sectioning as well as the other glands. Subcutaneous tissue and platysma were reapproximated with  interrupted 3-0 Monocryl suture. Marcaine was injected locally and 4-0  Monocryl was used to reapproximate the skin. Steri-Strips were  applied and the wounds were dressed sterilely. The sponge,  instrument, and needle count was correct at the end of the procedure.         MD CRISTIANA Mccain / FADIA  D:  11/02/2017   08:52  T:  11/02/2017   14:12  Job #:  [id number]

## 2017-11-03 VITALS
HEIGHT: 69 IN | DIASTOLIC BLOOD PRESSURE: 102 MMHG | SYSTOLIC BLOOD PRESSURE: 184 MMHG | HEART RATE: 84 BPM | RESPIRATION RATE: 20 BRPM | OXYGEN SATURATION: 100 % | BODY MASS INDEX: 18.57 KG/M2 | TEMPERATURE: 98.5 F | WEIGHT: 125.4 LBS

## 2017-11-03 PROBLEM — D64.9 ANEMIA: Status: ACTIVE | Noted: 2017-11-03

## 2017-11-03 PROBLEM — E21.3 HYPERPARATHYROIDISM (HCC): Status: ACTIVE | Noted: 2017-11-03

## 2017-11-03 PROBLEM — E83.51 HYPOCALCEMIA: Status: ACTIVE | Noted: 2017-11-03

## 2017-11-03 LAB
ALBUMIN SERPL-MCNC: 2.7 G/DL (ref 3.4–5)
ALBUMIN/GLOB SERPL: 0.7 {RATIO} (ref 0.8–1.7)
ALP SERPL-CCNC: 211 U/L (ref 45–117)
ALT SERPL-CCNC: 5 U/L (ref 16–61)
ANION GAP SERPL CALC-SCNC: 13 MMOL/L (ref 3–18)
AST SERPL-CCNC: 8 U/L (ref 15–37)
BILIRUB SERPL-MCNC: 0.3 MG/DL (ref 0.2–1)
BUN SERPL-MCNC: 45 MG/DL (ref 7–18)
BUN/CREAT SERPL: 5 (ref 12–20)
CA-I SERPL-SCNC: 0.75 MMOL/L (ref 1.12–1.32)
CALCIUM SERPL-MCNC: 6.6 MG/DL (ref 8.5–10.1)
CHLORIDE SERPL-SCNC: 98 MMOL/L (ref 100–108)
CO2 SERPL-SCNC: 27 MMOL/L (ref 21–32)
CREAT SERPL-MCNC: 8.39 MG/DL (ref 0.6–1.3)
ERYTHROCYTE [DISTWIDTH] IN BLOOD BY AUTOMATED COUNT: 20.1 % (ref 11.6–14.5)
GLOBULIN SER CALC-MCNC: 3.7 G/DL (ref 2–4)
GLUCOSE SERPL-MCNC: 77 MG/DL (ref 74–99)
HBV SURFACE AG SER QL: <0.1 INDEX
HBV SURFACE AG SER QL: NEGATIVE
HCT VFR BLD AUTO: 17.8 % (ref 36–48)
HCT VFR BLD AUTO: 26 % (ref 36–48)
HGB BLD-MCNC: 6.1 G/DL (ref 13–16)
HGB BLD-MCNC: 8.9 G/DL (ref 13–16)
MCH RBC QN AUTO: 25.5 PG (ref 24–34)
MCHC RBC AUTO-ENTMCNC: 34.3 G/DL (ref 31–37)
MCV RBC AUTO: 74.5 FL (ref 74–97)
PLATELET # BLD AUTO: 247 K/UL (ref 135–420)
PMV BLD AUTO: 7.5 FL (ref 9.2–11.8)
POTASSIUM SERPL-SCNC: 3.6 MMOL/L (ref 3.5–5.5)
PROT SERPL-MCNC: 6.4 G/DL (ref 6.4–8.2)
RBC # BLD AUTO: 2.39 M/UL (ref 4.7–5.5)
SODIUM SERPL-SCNC: 138 MMOL/L (ref 136–145)
WBC # BLD AUTO: 9.4 K/UL (ref 4.6–13.2)

## 2017-11-03 PROCEDURE — 36415 COLL VENOUS BLD VENIPUNCTURE: CPT | Performed by: SURGERY

## 2017-11-03 PROCEDURE — 74011250637 HC RX REV CODE- 250/637: Performed by: INTERNAL MEDICINE

## 2017-11-03 PROCEDURE — 74011000258 HC RX REV CODE- 258: Performed by: SURGERY

## 2017-11-03 PROCEDURE — 82330 ASSAY OF CALCIUM: CPT | Performed by: INTERNAL MEDICINE

## 2017-11-03 PROCEDURE — 74011250636 HC RX REV CODE- 250/636: Performed by: SURGERY

## 2017-11-03 PROCEDURE — 90935 HEMODIALYSIS ONE EVALUATION: CPT

## 2017-11-03 PROCEDURE — P9016 RBC LEUKOCYTES REDUCED: HCPCS | Performed by: SURGERY

## 2017-11-03 PROCEDURE — 85018 HEMOGLOBIN: CPT | Performed by: PHYSICIAN ASSISTANT

## 2017-11-03 PROCEDURE — A9270 NON-COVERED ITEM OR SERVICE: HCPCS | Performed by: SURGERY

## 2017-11-03 PROCEDURE — 74011250636 HC RX REV CODE- 250/636: Performed by: INTERNAL MEDICINE

## 2017-11-03 PROCEDURE — 74011250637 HC RX REV CODE- 250/637: Performed by: SURGERY

## 2017-11-03 PROCEDURE — 80053 COMPREHEN METABOLIC PANEL: CPT | Performed by: SURGERY

## 2017-11-03 PROCEDURE — 85027 COMPLETE CBC AUTOMATED: CPT | Performed by: SURGERY

## 2017-11-03 PROCEDURE — 5A1D70Z PERFORMANCE OF URINARY FILTRATION, INTERMITTENT, LESS THAN 6 HOURS PER DAY: ICD-10-PCS | Performed by: SURGERY

## 2017-11-03 PROCEDURE — 65660000000 HC RM CCU STEPDOWN

## 2017-11-03 PROCEDURE — 74011636637 HC RX REV CODE- 636/637: Performed by: SURGERY

## 2017-11-03 PROCEDURE — 74011250636 HC RX REV CODE- 250/636: Performed by: PHYSICIAN ASSISTANT

## 2017-11-03 PROCEDURE — 77010033678 HC OXYGEN DAILY

## 2017-11-03 PROCEDURE — 87340 HEPATITIS B SURFACE AG IA: CPT | Performed by: INTERNAL MEDICINE

## 2017-11-03 RX ORDER — SODIUM CHLORIDE 9 MG/ML
250 INJECTION, SOLUTION INTRAVENOUS AS NEEDED
Status: DISCONTINUED | OUTPATIENT
Start: 2017-11-03 | End: 2017-11-03 | Stop reason: HOSPADM

## 2017-11-03 RX ORDER — CALCIUM CARBONATE 200(500)MG
600 TABLET,CHEWABLE ORAL
Status: DISCONTINUED | OUTPATIENT
Start: 2017-11-03 | End: 2017-11-03 | Stop reason: HOSPADM

## 2017-11-03 RX ORDER — CALCIUM CARBONATE 200(500)MG
400 TABLET,CHEWABLE ORAL
Status: DISCONTINUED | OUTPATIENT
Start: 2017-11-03 | End: 2017-11-03

## 2017-11-03 RX ADMIN — SEVELAMER CARBONATE 800 MG: 800 TABLET, FILM COATED ORAL at 11:37

## 2017-11-03 RX ADMIN — CALCITRIOL 0.5 MCG: 0.25 CAPSULE, LIQUID FILLED ORAL at 08:27

## 2017-11-03 RX ADMIN — PREDNISONE 10 MG: 10 TABLET ORAL at 08:27

## 2017-11-03 RX ADMIN — CALCIUM GLUCONATE 2 G: 94 INJECTION, SOLUTION INTRAVENOUS at 10:00

## 2017-11-03 RX ADMIN — ANTACID TABLETS 400 MG: 500 TABLET, CHEWABLE ORAL at 11:37

## 2017-11-03 RX ADMIN — ASPIRIN 81 MG: 81 TABLET, COATED ORAL at 08:27

## 2017-11-03 RX ADMIN — HEPARIN SODIUM 5000 UNITS: 5000 INJECTION, SOLUTION INTRAVENOUS; SUBCUTANEOUS at 05:57

## 2017-11-03 RX ADMIN — ERYTHROPOIETIN 8000 UNITS: 4000 INJECTION, SOLUTION INTRAVENOUS; SUBCUTANEOUS at 14:42

## 2017-11-03 RX ADMIN — ANTACID TABLETS 400 MG: 500 TABLET, CHEWABLE ORAL at 08:27

## 2017-11-03 RX ADMIN — Medication 10 ML: at 05:57

## 2017-11-03 RX ADMIN — SEVELAMER CARBONATE 800 MG: 800 TABLET, FILM COATED ORAL at 08:27

## 2017-11-03 NOTE — ROUTINE PROCESS
Bedside shift change report given to Zaheer Marie RN (oncoming nurse) by Pipe Felix RN (offgoing nurse). Report included the following information SBAR, Kardex, MAR, Recent Results, Cardiac Rhythm NSR and Alarm Parameters .

## 2017-11-03 NOTE — PROGRESS NOTES
conducted an initial consultation and Spiritual Assessment for Gisele Islas, who is a 44 y. o.,male. Patients Primary Language is: [place]. According to the patients EMR [employer] Affiliation is: No preference. A very nice young man and quite positive about his condition. The reason the Patient came to the hospital is:   Patient Active Problem List    Diagnosis Date Noted    Hypocalcemia 11/03/2017    Anemia 11/03/2017    Hyperparathyroidism ([facility] 11/03/2017    Hyperparathyroidism, secondary renal ([facility] 11/02/2017    S/P bilateral BKA (below knee amputation) ([facility] 11/02/2017    ESRD (end stage renal disease) on dialysis ([facility] 11/02/2017    HTN (hypertension) 11/02/2017    S/P parathyroidectomy ([facility] 11/02/2017        The  provided the following Interventions:  Initiated a relationship of care and support. Explored issues of mikaela, belief, spirituality and Sikh/ritual needs while hospitalized. Listened empathically. Provided chaplaincy education. Provided information about Spiritual Care Services. Offered assurance of continued prayers on patients behalf. Chart reviewed. The following outcomes were achieved:  Patient shared limited information about both their medical narrative and spiritual journey/beliefs. Patient processed feeling about current hospitalization. Patient expressed gratitude for pastoral care visit. Assessment:  Patient does not have any Sikh/cultural needs that will affect patients preferences in health care. There are no further spiritual or Sikh issues which require intervention at this time. Plan:  Chaplains will continue to follow and will provide pastoral care on an as needed/requested basis.  recommends bedside caregivers page  on duty if patient shows signs of acute spiritual or emotional distress.       Sister Favio Delgado, [place], [address] [phone]

## 2017-11-03 NOTE — PROGRESS NOTES
Virtual reviewer communicated change to CM which reflect outpatient observation order written prior to discharge order being written. CM met with the patient and provided to the patient the printed information about her outpatient observation status. The patient was given the flyer entitled, \"Medicare Outpatient Observation: Information & notification. \" All questions were answered, no additional discharge needs identified at this time and patient expects to return to their (home, assisted living facility, relatives home, etc.) after discharge today.

## 2017-11-03 NOTE — PROGRESS NOTES
Nephrology Progress/HD note    Subjective: Yon Greer is a 44 y.o.   male with h/o HTN, HIV disease, Crohn's Disease, Bilateral BKAs , and ESRD with Severe Secondary hyperparathyroidism who underwent Total Parathyroidectomy today with Autotransplant. He is referred for ESRD management. S Ca 7.5 => 7.1 post op. Previous S ca 9.5 on 10/30/17. PTH 2851 on 10/9/17. Pt remains stable overnight. Seen on HD this afternoon. Has no cp or sob. Pt reports no paraesthesia, numbness or tingling in extremities.     Admit Date: 11/2/2017  Principal Problem:    S/P parathyroidectomy ([facility] (11/2/2017)    Active Problems:    Hyperparathyroidism, secondary renal ([facility] (11/2/2017)      S/P bilateral BKA (below knee amputation) ([facility] (11/2/2017)      ESRD (end stage renal disease) on dialysis ([facility] (11/2/2017)      HTN (hypertension) (11/2/2017)      Hypocalcemia (11/3/2017)      Anemia (11/3/2017)      Hyperparathyroidism ([facility] (11/3/2017)      Current Facility-Administered Medications   Medication Dose Route Frequency    0.9% sodium chloride infusion 250 mL  250 mL IntraVENous PRN    calcium carbonate (TUMS) chewable tablet 400 mg [elemental]  400 mg Oral TIDAC    amLODIPine (NORVASC) tablet 10 mg  10 mg Oral DAILY    aspirin delayed-release tablet 81 mg  81 mg Oral DAILY    lisinopril (PRINIVIL, ZESTRIL) tablet 40 mg  40 mg Oral DAILY    metoprolol succinate (TOPROL-XL) XL tablet 100 mg  100 mg Oral DAILY    predniSONE (DELTASONE) tablet 10 mg  10 mg Oral DAILY    sevelamer carbonate (RENVELA) tab 800 mg  800 mg Oral TID WITH MEALS    sodium chloride (NS) flush 5-10 mL  5-10 mL IntraVENous Q8H    sodium chloride (NS) flush 5-10 mL  5-10 mL IntraVENous PRN    calcium gluconate 2 g in 0.9% sodium chloride 100 mL IVPB  2 g IntraVENous TID    oxyCODONE-acetaminophen (PERCOCET) 5-325 mg per tablet 1 Tab  1 Tab Oral Q4H PRN    HYDROmorphone (PF) (DILAUDID) injection 1 mg  1 mg IntraVENous Q3H PRN    epoetin rick (EPOGEN;PROCRIT) injection 8,000 Units  8,000 Units IntraVENous DIALYSIS MON, WED & FRI    calcitRIOL (ROCALTROL) capsule 0.5 mcg  0.5 mcg Oral DAILY    0.9% sodium chloride infusion  100 mL/hr IntraVENous DIALYSIS PRN    albumin human 25% (BUMINATE) solution 12.5 g  12.5 g IntraVENous DIALYSIS PRN         Allergy:   Allergies   Allergen Reactions    Xanax [Alprazolam] Other (comments)     Hallucinations        Objective:     Visit Vitals    BP (!) 204/107    Pulse 85    Temp 98.3 °F (36.8 °C)    Resp 20    Ht 5' 8.5\" (1.74 m)    Wt 56.9 kg (125 lb 6.4 oz)    SpO2 100%    BMI 18.79 kg/m2         Intake/Output Summary (Last 24 hours) at 11/03/17 1535  Last data filed at 11/03/17 1430   Gross per 24 hour   Intake             1520 ml   Output                0 ml   Net             1520 ml       Physical Exam:       General: No acute distress on HD   HENT: Atraumatic and normocephalic   Eyes: Normal conjunctiva   Neck: Supple with no JVD   Cardiovascular: Normal S1 & S2, no m/r/g   Pulmonary/Chest Wall: Clear to auscultation bilaterally, no w/c   Abdominal: Soft and non-tender   Musculoskeletal: S/p Bilat BKA. S/p amputation of fingers.    Neurological: No focal deficits   Access: has good Qb    Data Review:  Reviewed   Lab Results   Component Value Date/Time    Sodium 138 11/03/2017 04:15 AM    Potassium 3.6 11/03/2017 04:15 AM    Chloride 98 11/03/2017 04:15 AM    CO2 27 11/03/2017 04:15 AM    Anion gap 13 11/03/2017 04:15 AM    Glucose 77 11/03/2017 04:15 AM    BUN 45 11/03/2017 04:15 AM    Creatinine 8.39 11/03/2017 04:15 AM    BUN/Creatinine ratio 5 11/03/2017 04:15 AM    GFR est AA 9 11/03/2017 04:15 AM    GFR est non-AA 7 11/03/2017 04:15 AM    Calcium 6.6 11/03/2017 04:15 AM     Lab Results   Component Value Date/Time    WBC 9.4 11/03/2017 04:15 AM    HGB 6.1 11/03/2017 04:15 AM    HCT 17.8 11/03/2017 04:15 AM    PLATELET 564 97/54/1396 04:15 AM    MCV 74.5 11/03/2017 04:15 AM Lab Results   Component Value Date/Time    Calcium 6.6 11/03/2017 04:15 AM     No results found for: IRON, FE, TIBC, IBCT, PSAT, FERR  No results found for: FERR      Impression:     1. ESRD on HD MWF at  unit  2. Severe SHPT s/p Parathyroidectomy with Autotransplant. 3. HTN, controlled  4. HIV disease  5. S/p Bilat BKA  6. Anemia of CKD  7. Crohn's disease  8. Hypocalcemia      Plan:     1. Monitor S Ca and Ionized Ca  2. IV Calcium gluconate. 3. Increase po calcium and po calcitriol  4. Dialysis today with 3.5 Ca bath  5. Goal UF 1-2L as tolerated  6. Use current access for HD  7. Continue BP meds  8. Cont SARAH on HD  9.  D/w pt and dialysis staff    MD Gage Interiano  [phone]

## 2017-11-03 NOTE — DIALYSIS
ACUTE HEMODIALYSIS FLOW SHEET  HEMODIALYSIS ORDERS: Physician: Ash Carpenter , H-H   Dialyzer: Revaclear Duration: 3 hr  BFR: 350   DFR: 800Labs:  [x] N/A   Dialysate:  Temp 37 K+   2    Ca+  3.5 Na 140 Bicarb 38   Weight:  56.9 kg kg    Bed Scale [x]  Dry weight/UF Goal: 2000 ml Access avf Needle Gauge 15   Heparin []  Bolus      Units    [] Hourly       Units    [x]None      Pre BP:   131/72    Pulse:     82     Temperature:   97.8  Respirations: 18       Additional Orders(medications, blood products, hypotension management): Epogen, 2U PRBCs     [x]  Time Out/Safety Check  [x] [facility] Consent Verified     CATHETER ACCESS: [x]N/A   []Right   []Left   []IJ     []Fem   [] First use X-ray verified     []Tunnel     [] Non Tunneled   []No S/S infection  []Redness  []Drainage []Cultured []Swelling []Pain   []Medical Aseptic Prep Utilized   []Dressing Changed  [] Biopatch  Date:       []Clotted   []Patent   Flows: []Good  []Poor  []Reversed   If access problem,  notified: n/a     GRAFT/FISTULA ACCESS:  [x]Right     []Left     []UE     [x]LE   []AVG   [x]AVF        []Buttonhole    []Medical Aseptic Prep Utilized   [x]No S/S infection  []Redness  []Drainage []Cultured []Swelling []Pain  Bruit:   [x] Strong    [] Weak       Thrill :   [] Strong    [] Weak     Needle Gauge: 15   Length: 1 in   If access problem,  notified: []Yes     [x]N/A  Date     GENERAL ASSESSMENT:    LUNGS:  Rate 20 SaO2%  -100%   [x] Clear  X 5 Lobes   Cough: []Productive  []Dry  [x]N/A   Respirations:  [x]Easy  []Labored   Therapy:  [x]RA  []NC  l/min    Mask: []NRB []Venti       O2%   CARDIAC: [x]Regular      [] Irregular   [] Pericardial Rub  [] JVD   EDEMA:   [x]Generalized  [] Pitting [] 1    [] 2    [] 3    [] 4 [x]  UE  [] LE   SKIN:   [x] Warm    [x] Dry     [] Pale   [] Diaphoretic           [] Flushed , dsg to cervical neck, C/D/I midline. pt is post op thyroidectomy. no difficulty swallowing,breathing and/or speaking.  Pt denies pain and/or discomfort states \"i feel fine\". LOC:    [x] Alert      [x]Oriented:    [x] Person     [x] Place  [x]Time   GI / ABDOMEN:  [x] Flat    [] Distended    [x] Soft    [] Firm   []  Obese [x] Bowel Sounds  []    / URINE ASSESSMENT:  [x] Oliguria  [] Anuria   []  Wei   [x]  Bathroom Privileges   PAIN: [x] 0 []1  []2   []3   []4   []5   []6   []7   []8   []9   []10            Scale 0-10  Action/Follow Up: n/a   MOBILITY:  [] Amb    [x] Amb/Assist-pt is bilateral BKA. [] Bed    [x] Wheelchair        All Vitals and Treatment Details on Attached [address] [address]: [phone]   [x] 1st Time Acute  [] Stat  [x] Routine  [] Urgent     [] Acute Room  [x]  Bedside  [] ICU/CCU  [] ER   Isolation Precautions:  [x] Dialysis   [] Airborne   [] Contact    [] Reverse   Special Considerations:         [x] Blood Consent Verified      ALLERGIES: Xanax   Code Status:  [x] Full Code  [] DNR  [] Other           HBsAg ONLY: Date Drawn 11/03/17       [x]Negative []Positive []Unknown   HBsAb: Date     [] Susceptible   [] [id number] [x]Not Drawn  [] Drawn     Current Labs:    Date of Labs: Today [x]       Results for Charlotte Mcgregor (MRN [id number]) as of 11/3/2017 15:48   Ref. Range 11/3/2017 04:15   Hepatitis B surface Ag Latest Ref Range: <1.00 Index <0.10   Hep B surface Ag Interp. Latest Ref Range: NEG   NEGATIVE     Results for Charlotte Mcgregor (MRN [id number]) as of 11/3/2017 15:48   Ref.  Range 11/3/2017 04:15   Ionized Calcium Latest Ref Range: 1.12 - 1.32 MMOL/L 0.753 (L)   WBC Latest Ref Range: 4.6 - 13.2 K/uL 9.4   RBC Latest Ref Range: 4.70 - 5.50 M/uL 2.39 (L)   HGB Latest Ref Range: 13.0 - 16.0 g/dL 6.1 (L)   HCT Latest Ref Range: 36.0 - 48.0 % 17.8 (LL)   MCV Latest Ref Range: 74.0 - 97.0 FL 74.5   MCH Latest Ref Range: 24.0 - 34.0 PG 25.5   MCHC Latest Ref Range: 31.0 - 37.0 g/dL 34.3   RDW Latest Ref Range: 11.6 - 14.5 % 20.1 (H)   PLATELET Latest Ref Range: 135 - 420 K/uL 247   MPV Latest Ref Range: 9.2 - 11.8 FL 7.5 (L)   Sodium Latest Ref Range: 136 - 145 mmol/L 138   Potassium Latest Ref Range: 3.5 - 5.5 mmol/L 3.6   Chloride Latest Ref Range: 100 - 108 mmol/L 98 (L)   CO2 Latest Ref Range: 21 - 32 mmol/L 27   Anion gap Latest Ref Range: 3.0 - 18 mmol/L 13   Glucose Latest Ref Range: 74 - 99 mg/dL 77   BUN Latest Ref Range: 7.0 - 18 MG/DL 45 (H)   Creatinine Latest Ref Range: 0.6 - 1.3 MG/DL 8.39 (H)   BUN/Creatinine ratio Latest Ref Range: 12 - 20   5 (L)   Calcium Latest Ref Range: 8.5 - 10.1 MG/DL 6.6 (L)   GFR est non-AA Latest Ref Range: >60 ml/min/1.73m2 7 (L)   GFR est AA Latest Ref Range: >60 ml/min/1.73m2 9 (L)   Bilirubin, total Latest Ref Range: 0.2 - 1.0 MG/DL 0.3   Protein, total Latest Ref Range: 6.4 - 8.2 g/dL 6.4   Albumin Latest Ref Range: 3.4 - 5.0 g/dL 2.7 (L)   Globulin Latest Ref Range: 2.0 - 4.0 g/dL 3.7   A-G Ratio Latest Ref Range: 0.8 - 1.7   0.7 (L)   ALT (SGPT) Latest Ref Range: 16 - 61 U/L 5 (L)   AST Latest Ref Range: 15 - 37 U/L 8 (L)   Alk. phosphatase Latest Ref Range: 45 - 117 U/L 211 (H)   Hepatitis B surface Ag Latest Ref Range: <1.00 Index <0.10   Hep B surface Ag Interp.  Latest Ref Range: NEG   NEGATIVE   HEP B SURFACE AG Unknown Rpt                                                                                                                              DIET:  [x] Renal    [] Other     [] NPO     []  Diabetic      PRIMARY NURSE REPORT: First initial/Last name/Title    Pre Dialysis: Umm Arevalo RN Time: 1600      EDUCATION:    [x] Patient   Knowledge Basis:  []Minimal [x] Substantial ,Barriers to learning  [x]N/A   [x] Access Care     [] S&S of infection     [x] Fluid Management     []K+     [x]Procedural    []Albumin     [] Medications     [] Tx Options     [] Transplant     [] Diet     [] Other   Teaching Tools:  [x] Explain  Patient response:[x] Verbalized understanding [] Requires follow up:n/a     RO/HEMODIALYSIS MACHINE SAFETY CHECKS  Before each treatment: [facility]  [] Portable Machine #1/RO serial # [id number]   [x] Portable Machine #2/RO serial # [id number]       Alarm Test:  Pass time:1201        Other:         [x] RO/Machine Log Complete  [x]Extracorporeal Circuit Tested for integrity   Temp    37    Dialysate: pH  7 Conductivity: Meter   14     HD Machine   14.2       TCD: 13.9     Dialyzer Lot # [id number]            Blood Tubing Lot # [id number]          Saline Lot #  [id number]     CHLORINE TESTING-Before each treatment and every 4 hours    Total Chlorine: [x] less than 0.1 ppm  Time: 2555 4 Hr/2nd Check Time: n/a   (if greater than 0.1 ppm from Primary then every 30 minutes from Secondary)     TREATMENT INITIATION  with Dialysis Precautions:   [x] All Connections Secured                 [x] Saline Line Double Clamped   [x] Venous Parameters Set                  [x] Arterial Parameters Set    [x] Prime Given  250 ml               [x]Air Foam Detector Engaged      Treatment Initiation Note: Tx initiated without difficulty will cont. To monitor. 1354- First unit of blood completed, pt tolerated welol. V/S remain stable and pts pressures are unchanged. Second unit will be started shortly. Pt voices no c/o pain and/or discomfort at this time. Medication Dose Volume Route Initials Dialyzer Cleared: [] Good [x] Fair  [] Poor Blood processed:  58 L  UF Removed  2000 Ml ,Est post Wt: 55 kg, Post BP:  184/102       Pulse: 84      Respirations: 20  Temperature: 98.5   Epogen 8000u 2ml Dialysis  ST Post Tx Vascular Access: AVF/AVG: Bleeding stopped [id number]. Rafiq. 10Min                                  Catheter: Locking solution: Heparin 1:1000 -- N/A                                 Post Assessment: No significant changes from above assessment                                 Skin:  [x] Warm  [x] Dry [] Diaphoretic    [] Flushed  [] Pale [] Cyanotic   [facility] Signatures Title Initials  Time Lungs: [x] Clear    [] Course  [] Crackles  [] Wheezing [] Diminished Tremaine Delarosa RN [address], RN 2417 Cardiac: [x] Regular   [] Irregular   [] Monitor  [] N/A  Rhythm:           Edema: [x] General     [x] UE    [] LE       Pain: [x]0  []1  []2   []3  []4   []5   []6   []7   []8   []9   []10     Post Treatment Note::Pt tolerated HD very well, 2 units PRBC transfused w/ dialysis, net UF removed 2000 ml.      POST TREATMENT PRIMARY NURSE HANDOFF REPORT:   First initial/Last name/Title       Post Dialysis: Catherine Sim RN Time:  0787

## 2017-11-03 NOTE — PROGRESS NOTES
Progress Note    Patient: Leonel Seals MRN: [id number]  CSN: [id number]    YOB: 1980  Age: 44 y.o. Sex: male    DOA: 11/2/2017 LOS:  LOS: 1 day                    Subjective:     No complaints, no pain, no tingling    Objective:      Visit Vitals    /76 (BP 1 Location: Left arm, BP Patient Position: At rest)    Pulse 86    Temp 98.5 °F (36.9 °C)    Resp 20    Ht 5' 8.5\" (1.74 m)    Wt 56.9 kg (125 lb 6.4 oz)    SpO2 100%    BMI 18.79 kg/m2       Physical Exam:  Neck without swelling, but some blood on dressing    Intake and Output:  Current Shift:  11/03 0701 - 11/03 1900  In: 240 [P.O.:240]  Out: -   Last three shifts:  11/01 1901 - 11/03 0700  In: 1260 [P.O.:540; I.V.:720]  Out: -     Labs: Results:       Chemistry Recent Labs      11/03/17 0415  11/02/17   1533  11/02/17   1220  11/02/17   0608   GLU  77   --    --    --    NA  138   --    --    --    K  3.6   --    --   3.8   CL  98*   --    --    --    CO2  27   --    --    --    BUN  45*   --    --    --    CREA  8.39*   --    --    --    CA  6.6*  7.1*   --    --    AGAP  13   --    --    --    BUCR  5*   --    --    --    AP  211*   --    --    --    TP  6.4   --    --    --    ALB  2.7*   --   2.8*   --    GLOB  3.7   --    --    --    AGRAT  0.7*   --    --    --       CBC w/Diff Recent Labs      11/03/17 0415   WBC  9.4   RBC  2.39*   HGB  6.1*   HCT  17.8*   PLT  247      Cardiac Enzymes No results for input(s): CPK, CKND1, YUMIKO in the last 72 hours. No lab exists for component: CKRMB, TROIP   Coagulation Recent Labs      11/02/17   1220   PTP  15.0   INR  1.2       Lipid Panel No results found for: CHOL, CHOLPOCT, CHOLX, CHLST, CHOLV, 391288, HDL, LDL, LDLC, DLDLP, 754588, VLDLC, VLDL, TGLX, TRIGL, TRIGP, TGLPOCT, CHHD, CHHDX   BNP No results for input(s): BNPP in the last 72 hours.    Liver Enzymes Recent Labs      11/03/17   0415   TP  6.4   ALB  2.7*   AP  211*   SGOT  8*      Thyroid Studies No results found for: T4, T3U, TSH, TSHEXT         Medications Reviewed      Assessment/Plan     Principal Problem:    S/P parathyroidectomy ([facility] (11/2/2017)    Active Problems:    Hyperparathyroidism, secondary renal ([address] (11/2/2017)      S/P bilateral BKA (below knee amputation) ([address] (11/2/2017)      ESRD (end stage renal disease) on dialysis ([address] (11/2/2017)      HTN (hypertension) (11/2/2017)      Hypocalcemia (11/3/2017)      Anemia (11/3/2017)      Hyperparathyroidism ([address] (11/3/2017)        Stop Heparin, will d/w nephology to see if pt can be d/c'd home on oral calcium

## 2017-11-03 NOTE — ROUTINE PROCESS
Bedside shift change report given to Jamie Fischer RN (oncoming nurse) by Ivette Mercedes RN (offgoing nurse). Report included the following information SBAR, Kardex, Procedure Summary, Intake/Output, MAR, Recent Results, Med Rec Status and Cardiac Rhythm NSR.     0959 Calcium gluconate given upon arrival from Pharmacy  Anterior neck dressing CDI, no edema noted to neck. Speech clear, denies difficulty swallowing.  Lungs clear bilat

## 2017-11-03 NOTE — PROGRESS NOTES
Hospitalist Progress Note    Patient: Javi Romero MRN: [id number]  CSN: [id number]    YOB: 1980  Age: 44 y.o. Sex: male    DOA: 11/2/2017 LOS:  LOS: 1 day          Chief Complaint:    Consult for Co-management    Assessment/Plan     1. S/P Total Parathyroidectomy   2. ESRD on HD  3. HTN  4. Chron's Disease  5. Hx of Thromboembolus  6. Hx of Depression  7. Bilateral BKA  8. Severe Anemia    1. Serum calcium remains low, ionized calcium also low. Patient receiving IV calcium gluconate and oral calcium supplementation. Will continue per nephrology recs. Patient will have HD today with Ca bath. 2. Patient will have HD today. 3. Discontinued HCTZ yesterday. Continue Norvasc, Zestril, and Toprol. BP remains stable. 4. Prednisone daily  5. Heparin for DVT Prophy  8. Patient with H&H 6.1/17.8 this AM. Patient will undergo blood transfusion today with 2 units PRBCs. Will recheck [facility] after HD today to determine response.      Patient Active Problem List   Diagnosis Code    Hyperparathyroidism, secondary renal ([facility] N25.81    S/P bilateral BKA (below knee amputation) ([facility] Z89.512, Z89.511    ESRD (end stage renal disease) on dialysis ([facility]) N18.6, Z99.2    HTN (hypertension) I10    S/P parathyroidectomy ([facility] E89.2    Hypocalcemia E83.51    Anemia D64.9    [facility] E21.3       Subjective:    Patient anxious to go home, does not want to be in hospital any longer    Review of systems:    Constitutional: denies fevers, chills, myalgias  Respiratory: denies SOB, cough  Cardiovascular: denies chest pain, palpitations  Gastrointestinal: denies nausea, vomiting, diarrhea      Vital signs/Intake and Output:  Visit Vitals    /76 (BP 1 Location: Left arm, BP Patient Position: At rest)    Pulse 86    Temp 98.5 °F (36.9 °C)    Resp 20    Ht 5' 8.5\" (1.74 m)    Wt 56.9 kg (125 lb 6.4 oz)    SpO2 100%    BMI 18.79 kg/m2     Current Shift:  11/03 0701 - 11/03 1900  In: 240 [P.O.:240]  Out: -   Last three shifts:  11/01 1901 - 11/03 0700  In: 1260 [P.O.:540; I.V.:720]  Out: -     Exam:    General: Well developed, alert, NAD, OX3  Head/Neck: NCAT, supple, No masses, No lymphadenopathy  CVS:Regular rate and rhythm, systolic murmur, U6/V7 heard, no thrill  Lungs:Clear to auscultation bilaterally, no wheezes, rhonchi, or rales  Abdomen: Soft, Nontender, No distention, Normal Bowel sounds, No hepatomegaly  Extremities: Bilateral BKA, amputation of all appendages of L hand. Skin:normal texture and turgor, no rashes, no lesions  Neuro:grossly normal , follows commands  Psych:appropriate                Labs: Results:       Chemistry Recent Labs      11/03/17 0415  11/02/17   1533  11/02/17   1220  11/02/17   0608   GLU  77   --    --    --    NA  138   --    --    --    K  3.6   --    --   3.8   CL  98*   --    --    --    CO2  27   --    --    --    BUN  45*   --    --    --    CREA  8.39*   --    --    --    CA  6.6*  7.1*   --    --    AGAP  13   --    --    --    BUCR  5*   --    --    --    AP  211*   --    --    --    TP  6.4   --    --    --    ALB  2.7*   --   2.8*   --    GLOB  3.7   --    --    --    AGRAT  0.7*   --    --    --       CBC w/Diff Recent Labs      11/03/17 0415   WBC  9.4   RBC  2.39*   HGB  6.1*   HCT  17.8*   PLT  247      Cardiac Enzymes No results for input(s): CPK, CKND1, YUMIKO in the last 72 hours. No lab exists for component: CKRMB, TROIP   Coagulation Recent Labs      11/02/17   1220   PTP  15.0   INR  1.2       Lipid Panel No results found for: CHOL, CHOLPOCT, CHOLX, CHLST, CHOLV, 373481, HDL, LDL, LDLC, DLDLP, 763355, VLDLC, VLDL, TGLX, TRIGL, TRIGP, TGLPOCT, CHHD, CHHDX   BNP No results for input(s): BNPP in the last 72 hours.    Liver Enzymes Recent Labs      11/03/17   0415   TP  6.4   ALB  2.7*   AP  211*   SGOT  8*      Thyroid Studies No results found for: T4, T3U, TSH, TSHEXT     Procedures/imaging: see electronic medical records for all procedures/Xrays and details which were not copied into this note but were reviewed prior to creation of [address] Harjit Shanks, PA-C

## 2017-11-03 NOTE — PROGRESS NOTES
Pt admitted for an elective surgical procedure. Pt with a history of bilateral BKA with prosthesis, ESRD, hypertension and hypocalcemia. Pt lives with his parents and they assist as needed. Met with pt and his mother at bedside. Pt does have a cane and prosthesis. Both pt and his mother site no needs given pt's needs are being met at home. No plan of care needs identified. Anticipate pt will be medically stable for discharge within the next 24-48 hours. CM available to assist as needed. Discharge Reassessment Plan:  Low Risk    RRAT Score:  1 - 12     Low Risk Care Transition Interventions:  1. Discharge transition plan:  Physician follow up  2. Involved patient/caregiver in assessment, planning, education and implement of intervention. 3. CM daily patient care huddles/interdisciplinary rounds were completed. 4. PCP/Specialist appointment within 5 days made prior to discharge. Date/Time  5. Facilitated transportation and logistics for follow-up appointments. 6. Handoff to [address] [place] [address] Nurse Navigator or PCP practice. Care Management Interventions  PCP Verified by CM: Yes  Mode of Transport at Discharge:  Other (see comment) (Parents/Family)  Transition of Care Consult (CM Consult): Discharge Planning  Health Maintenance Reviewed: Yes  Current Support Network: Relative's Home  Confirm Follow Up Transport: Family  Plan discussed with Pt/Family/Caregiver: Yes  Discharge Location  Discharge Placement: Home with family assistance

## 2017-11-03 NOTE — ROUTINE PROCESS
Pt okay to manage calcium tx at home, per Dr. Lior Holloway. Discussed case with STEFANIA Mcgrath about discharge who states to recheck Hgb&Hct upon completion of dialysis to ensure they have improved following 2 units PRBCs. Recheck H&H 8.9/26.0    Spoke with Dr. Lior Holloway regarding discharge. States that pt will take Rocaltrol 0.5 mg by mouth daily and Tums 3 tabs TID at discharge. Prescription for Rocaltrol has been sent in to pts pharmacy per Dr. Lior Holloway. MD states pt may get Tums over the counter. This has been discussed with pt who expresses understanding.

## 2017-11-03 NOTE — PROGRESS NOTES
[] Drip  [x] None  Pt expected to meet estimated nutrient needs through next review:          [x]  Yes     ANTHROPOMETRICS  Height: 5' 8.5\" (174 cm)       Weight: 56.9 kg (125 lb 6.4 oz)    BMI: 18.8 kg/m^2  -  normal weight (18.5%-24.9% BMI)   --BMI not accurate indicator in wt status in pt with b/l BKA     Weight change: wt in 7/2017 63.2 approx wt loss of 6kg or 9% body weight x 3 months. C/w recent significant wt loss                                  Comparison to Reference Standards:  IBW: 136 lbs--adj for b/l BKA      %IBW: 92%          NUTRITION-FOCUSED PHYSICAL ASSESSMENT  Skin: skin intact per documentation   GI:BM 11/2    BIOCHEMICAL DATA & MEDICAL TESTS  Pertinent Labs:  [x] Reviewed     NUTRITION PRESCRIPTION  Calories: 1707 kcal/day based on 30kcal/kg  Protein: 74 g/day based on 1.3 g/kg  CHO: 213 g/day based on 50% of total energy  Fluid: 1707 ml/day based on 1 kcal/ml      NUTRITION DIAGNOSES:   1. Predicted sub optimal intake related to increased energy needs as evidenced by wt loss of 9% x 3 months    NUTRITION INTERVENTIONS:   INTERVENTIONS:        GOALS:  1. Commercial beverages: Nepro 1. PO intakes >=75% of meals and supplements throughout the next 5-7 days     LEARNING NEEDS (Diet, Supplementation, Food/Nutrient-Drug Interaction):   [x] None Identified  [] Inpatient education provided/documented    [] Identified and patient:  [] Declined     [] Was not appropriate/indicated  NUTRITION MONITORING /EVALUATION:   Follow PO intake  Monitor wt  Monitor renal labs, electrolytes, fluid status  Monitor for additional supplement needs    [] Participated in Interdisciplinary Rounds  [x] [address] [place] [address] Reviewed/Documented  DISCHARGE NUTRITION RECOMMENDATIONS ADDRESSED:     [x] Yes- recommended renal diet     [] To be determined closer to discharge    NUTRITION RISK:     [x]  At risk                     []  Not currently at risk     Will follow-up per policy.   Nara Dhillon RD  PAGER: [phone]

## 2017-11-03 NOTE — PROGRESS NOTES
0505- Critical HCT called by lab. Dr. Teresa Le notified. 2 units of RBC ordered to be given with dialysis today. Shift Summary- Critical HCT called this AM. Dr. Teresa eL made aware. No further changes in pt status during the night.     Patient Vitals for the past 12 hrs:   Temp Pulse Resp BP SpO2   11/03/17 0412 98.2 °F (36.8 °C) 77 16 159/82 99 %   11/02/17 2336 98.2 °F (36.8 °C) 77 16 160/84 100 %   11/02/17 2025 98 °F (36.7 °C) 85 18 161/88 98 %

## 2017-11-03 NOTE — DISCHARGE INSTRUCTIONS
DISCHARGE SUMMARY from Nurse    PATIENT INSTRUCTIONS:    After general anesthesia or intravenous sedation, for 24 hours or while taking prescription Narcotics:  · Limit your activities  · Do not drive and operate hazardous machinery  · Do not make important personal or business decisions  · Do  not drink alcoholic beverages  · If you have not urinated within 8 hours after discharge, please contact your surgeon on call. Report the following to your surgeon:  · Excessive pain, swelling, redness or odor of or around the surgical area  · Temperature over 100.5  · Nausea and vomiting lasting longer than 4 hours or if unable to take medications  · Any signs of decreased circulation or nerve impairment to extremity: change in color, persistent  numbness, tingling, coldness or increase pain  · Any questions    What to do at Home:  Recommended activity: Activity as tolerated and No driving while on analgesics    If you experience any of the following symptoms temperature greater than 101, uncontrolled nausea, vomiting, diarrhea, large amounts bleeding from surgical site, yellow/green drainage from surgical site, swelling, difficulty swallowing, or difficulty breathing, please follow up with Dr. Nargis Orosco. *  Please give a list of your current medications to your Primary Care Provider. *  Please update this list whenever your medications are discontinued, doses are      changed, or new medications (including over-the-counter products) are added. *  Please carry medication information at all times in case of emergency situations. These are general instructions for a healthy lifestyle:    No smoking/ No tobacco products/ Avoid exposure to second hand smoke  Surgeon General's Warning:  Quitting smoking now greatly reduces serious risk to your health.     Obesity, smoking, and sedentary lifestyle greatly increases your risk for illness    A healthy diet, regular physical exercise & weight monitoring are important for maintaining a healthy lifestyle    You may be retaining fluid if you have a history of heart failure or if you experience any of the following symptoms:  Weight gain of 3 pounds or more overnight or 5 pounds in a week, increased swelling in our hands or feet or shortness of breath while lying flat in bed. Please call your doctor as soon as you notice any of these symptoms; do not wait until your next office visit. Recognize signs and symptoms of STROKE:    F-face looks uneven    A-arms unable to move or move unevenly    S-speech slurred or non-existent    T-time-call 911 as soon as signs and symptoms begin-DO NOT go       Back to bed or wait to see if you get better-TIME IS BRAIN. Warning Signs of HEART ATTACK     Call 911 if you have these symptoms:   Chest discomfort. Most heart attacks involve discomfort in the center of the chest that lasts more than a few minutes, or that goes away and comes back. It can feel like uncomfortable pressure, squeezing, fullness, or pain.  Discomfort in other areas of the upper body. Symptoms can include pain or discomfort in one or both arms, the back, neck, jaw, or stomach.  Shortness of breath with or without chest discomfort.  Other signs may include breaking out in a cold sweat, nausea, or lightheadedness. Don't wait more than five minutes to call 911 - MINUTES MATTER! Fast action can save your life. Calling 911 is almost always the fastest way to get lifesaving treatment. Emergency Medical Services staff can begin treatment when they arrive -- up to an hour sooner than if someone gets to the hospital by car. The discharge information has been reviewed with the patient. The patient verbalized understanding.   Discharge medications reviewed with the patient and appropriate educational materials and side effects teaching were provided. ___________________________________________________________________________________________________________________________________     Parathyroidectomy: What to Expect at [facility]    Parathyroidectomy is the removal of one or more of the four parathyroid glands in the neck. These small glands, located on the thyroid gland, help control the amount of calcium in the body. When they are too active, these glands cause high levels of calcium. This is called hyperparathyroidism (say \"hy-per-pair-gv-LME-fyyk-iz-um\"). You may leave the hospital with stitches in the cut the doctor made (incision). Your doctor will tell you if you need to come back to have these removed. You may still have a tube called a drain in your neck. Your doctor will take this out a few days after your surgery. You may have some trouble chewing and swallowing after you go home. Your voice probably will be hoarse, and you may have trouble talking. For most people, these problems get better within a few weeks, but it can take longer. In some cases, this surgery causes permanent problems with chewing, speaking, or swallowing. This care sheet gives you a general idea about how long it will take for you to recover. But each person recovers at a different pace. Follow the steps below to get better as quickly as possible. How can you care for yourself at home? Activity  ? · Rest when you feel tired. Getting enough sleep will help you recover. When you lie down, put two or three pillows under your head to keep it raised. ? · Try to walk each day. Start by walking a little more than you did the day before. Bit by bit, increase the amount you walk. Walking boosts blood flow and helps prevent pneumonia and constipation. ? · Avoid strenuous physical activity and lifting heavy objects for 3 weeks after surgery or until your doctor says it is okay. ? · Do not over-extend your neck backwards for 2 weeks after surgery.    ? · Ask your doctor when you can drive again. ? · You may take a shower, unless you still have a drain. Pat the incision dry. If you have a drain coming out of your incision, follow your doctor's instructions to care for it. Diet  ? · If swallowing is painful, start out with cold drinks, flavored ice pops, and ice cream. Next, try soft foods like pudding, yogurt, canned or cooked fruit, scrambled eggs, and mashed potatoes. Avoid eating hard or scratchy foods like chips or raw vegetables. Avoid orange or tomato juice and other acidic foods that can sting the throat. ? · If you cough right after drinking, try drinking thicker liquids, such as a smoothie. ? · You may notice that your bowel movements are not regular right after your surgery. This is common. Try to avoid constipation and straining with bowel movements. You may want to take a fiber supplement every day. If you have not had a bowel movement after a couple of days, ask your doctor about taking a mild laxative. Medicines  ? · Your doctor will tell you if and when you can restart your medicines. He or she will also give you instructions about taking any new medicines. ? · If you take blood thinners, such as warfarin (Coumadin), clopidogrel (Plavix), or aspirin, be sure to talk to your doctor. He or she will tell you if and when to start taking those medicines again. Make sure that you understand exactly what your doctor wants you to do. ? · Be safe with medicines. Take pain medicines exactly as directed. ¨ If the doctor gave you a prescription medicine for pain, take it as prescribed. ¨ If you are not taking a prescription pain medicine, ask your doctor if you can take an over-the-counter medicine. ? · If you think your pain medicine is making you sick to your stomach:  ¨ Take your medicine after meals (unless your doctor has told you not to). ¨ Ask your doctor for a different pain medicine.    ? · Your doctor may prescribe calcium to prevent problems after surgery from low calcium. Not having enough calcium can cause symptoms such as tingling around your mouth or in your hands and feet. ? · Your doctor may have prescribed antibiotics. Take them as directed. Do not stop taking them just because you feel better. You need to take the full course of antibiotics. Incision care  ? · If your doctor told you how to care for your incision, follow your doctor's instructions. If you did not get instructions, follow this general advice:  ¨ After the first 24 to 48 hours, wash around the incision with clean water 2 times a day. Don't use hydrogen peroxide or alcohol, which can slow healing. ? · You may have a drain near your incision. Your doctor will tell you how to take care of it. Follow-up care is a key part of your treatment and safety. Be sure to make and go to all appointments, and call your doctor if you are having problems. It's also a good idea to know your test results and keep a list of the medicines you take. When should you call for help? Call 911 anytime you think you may need emergency care. For example, call if:  ? · You passed out (lost consciousness). ? · You have sudden chest pain and shortness of breath, or you cough up blood. ? · You have severe trouble breathing. ?Call your doctor now or seek immediate medical care if:  ? · You have loose stitches, or your incision comes open. ? · You have blood leaking from your incision. ? · You have signs of infection, such as:  ¨ Increased pain, swelling, warmth, or redness. ¨ Red streaks leading from the incision. ¨ Pus draining from the incision. ¨ A fever. ? · You have a tingling feeling around your mouth. ? · You have cramping or tingling in your hands and feet. ? Watch closely for changes in your health, and be sure to contact your doctor if:  ? · You have trouble talking. ? · You are sick to your stomach or cannot keep fluids down.    ? · You do not have a bowel movement after taking a laxative. Where can you learn more? Go to http://shanice-flakito.info/. Enter M010 in the search box to learn more about \"Parathyroidectomy: What to Expect at Home. \"  Current as of: May 12, 2017  Content Version: 11.4  © 8896-9653 [employer], [employer]. Care instructions adapted under license by [employer] (which disclaims liability or warranty for this information). If you have questions about a medical condition or this instruction, always ask your healthcare professional. [address] any warranty or liability for your use of this information.

## 2017-11-04 LAB
ABO + RH BLD: NORMAL
BLD PROD TYP BPU: NORMAL
BLD PROD TYP BPU: NORMAL
BLOOD GROUP ANTIBODIES SERPL: NORMAL
BPU ID: NORMAL
BPU ID: NORMAL
CALLED TO:,BCALL1: NORMAL
CROSSMATCH RESULT,%XM: NORMAL
CROSSMATCH RESULT,%XM: NORMAL
SPECIMEN EXP DATE BLD: NORMAL
STATUS OF UNIT,%ST: NORMAL
STATUS OF UNIT,%ST: NORMAL
UNIT DIVISION, %UDIV: 0
UNIT DIVISION, %UDIV: 0

## 2017-12-07 NOTE — DISCHARGE SUMMARY
[facility] SUMMARY    Name:  Jacques Bolanos  MR#:  [id number]  :  1980  Account #:  [de-identified]  Date of Adm:  2017  Date of Discharge:  2017      FINAL DIAGNOSES  1. Severe secondary hyperparathyroidism. 2. End-stage renal disease. PROCEDURES PERFORMED: Total parathyroidectomy with  auto transplant. HISTORY OF PRESENT ILLNESS: This is a 65-year-old male on  dialysis, who had severe secondary hyperparathyroidism and was  brought to the operating room for parathyroidectomy. HOSPITAL COURSE: The patient was brought to the operating room  the same day for the above-mentioned procedure. His postoperative  course was remarkable for the expected hypocalcemia. This was  treated both with IV and oral calcium. He was totally asymptomatic  from the hypocalcemia. His incision remained clean and dry. He had  no neck swelling. He did undergo dialysis while in the hospital. He was  discharged home to follow up in the office in a week.         MD CRISTIANA Ross / SHANNA  D:  2017   10:10  T:  2017   09:32  Job #:  [id number]

## 2019-03-07 NOTE — PROGRESS NOTES
1000-received report from JACE Land RN included AWILDA MAGANA and Kardex,  1640-report given to [facility] Job included SBAR MAR and Kardex. Yes

## (undated) DEVICE — MINOR: Brand: MEDLINE INDUSTRIES, INC.

## (undated) DEVICE — INTENDED FOR TISSUE SEPARATION, AND OTHER PROCEDURES THAT REQUIRE A SHARP SURGICAL BLADE TO PUNCTURE OR CUT.: Brand: BARD-PARKER SAFETY BLADES SIZE 15, STERILE

## (undated) DEVICE — SUT MONOCRYL PLUS UD 4-0 --

## (undated) DEVICE — SPONGE GZ W4XL4IN COT 12 PLY TYP VII WVN C FLD DSGN

## (undated) DEVICE — MASTISOL ADHESIVE LIQ 2/3ML

## (undated) DEVICE — (D)STRIP SKN CLSR 0.5X4IN WHT --

## (undated) DEVICE — INSULATED BLADE ELECTRODE: Brand: EDGE

## (undated) DEVICE — GOWN,SIRUS,NONRNF,SETINSLV,XL,20/CS: Brand: MEDLINE

## (undated) DEVICE — SUTURE VCRL + SZ 3-0 L18IN ABSRB UD SH 1/2 CIR TAPERCUT NDL VCP864D

## (undated) DEVICE — HEAD REST BAGEL: Brand: DEVON

## (undated) DEVICE — SOL IRRIGATION INJ NACL 0.9% 500ML BTL

## (undated) DEVICE — SHEAR HARMONIC FOCUS OEM 9CM --

## (undated) DEVICE — DRAPE,THYROID,SOFT,STERILE: Brand: MEDLINE

## (undated) DEVICE — KENDALL SCD EXPRESS SLEEVES, KNEE LENGTH, MEDIUM: Brand: KENDALL SCD

## (undated) DEVICE — TRACHEOTOMY TRAY: Brand: CENTURION

## (undated) DEVICE — SPONGE HEMOSTAT CELLULS 4X8IN -- SURGICEL

## (undated) DEVICE — STERILE POLYISOPRENE POWDER-FREE SURGICAL GLOVES WITH EMOLLIENT COATING: Brand: PROTEXIS

## (undated) DEVICE — APPLIER CLP L9.375IN APER 2.1MM CLS L3.8MM 20 SM TI CLP

## (undated) DEVICE — SPONGE LAP RND 3/8X0.25 IN 5 COUNT HOLDER WHT AMER WHT CROSS